# Patient Record
Sex: MALE | Race: WHITE | NOT HISPANIC OR LATINO | Employment: FULL TIME | URBAN - METROPOLITAN AREA
[De-identification: names, ages, dates, MRNs, and addresses within clinical notes are randomized per-mention and may not be internally consistent; named-entity substitution may affect disease eponyms.]

---

## 2017-01-24 ENCOUNTER — ALLIED HEALTH/NURSE VISIT (OUTPATIENT)
Dept: FAMILY MEDICINE | Facility: CLINIC | Age: 62
End: 2017-01-24
Payer: COMMERCIAL

## 2017-01-24 DIAGNOSIS — Z23 NEED FOR PROPHYLACTIC VACCINATION AND INOCULATION AGAINST INFLUENZA: Primary | ICD-10-CM

## 2017-01-24 PROCEDURE — 90471 IMMUNIZATION ADMIN: CPT

## 2017-01-24 PROCEDURE — 99207 ZZC NO CHARGE NURSE ONLY: CPT

## 2017-01-24 PROCEDURE — 90686 IIV4 VACC NO PRSV 0.5 ML IM: CPT

## 2017-01-24 NOTE — PROGRESS NOTES
Injectable Influenza Immunization Documentation    1.  Is the person to be vaccinated sick today?  No    2. Does the person to be vaccinated have an allergy to eggs or to a component of the vaccine?  No    3. Has the person to be vaccinated today ever had a serious reaction to influenza vaccine in the past?  No    4. Has the person to be vaccinated ever had Guillain-[place] syndrome?  No     Form completed by Nedra Mchugh MA

## 2017-01-24 NOTE — NURSING NOTE
Prior to injection verified patient identity using patient's name and date of birth.  Per orders of Dr. Nowak injection of Flu  given by Nedra Mchugh MA. Patient instructed to remain in clinic for 20 minutes afterwards and to report any adverse reaction to me immediately.

## 2017-03-28 DIAGNOSIS — K21.9 GASTROESOPHAGEAL REFLUX DISEASE WITHOUT ESOPHAGITIS: ICD-10-CM

## 2017-03-28 NOTE — TELEPHONE ENCOUNTER
omeprazole (PRILOSEC) 20 MG capsule      Last Written Prescription Date: 3/30/16  Last Fill Quantity: 30,  # refills: 11   Last Office Visit with [facility], [facility] or [facility] prescribing provider: 7/14/16

## 2017-05-23 DIAGNOSIS — G47.00 INSOMNIA: ICD-10-CM

## 2017-05-25 NOTE — TELEPHONE ENCOUNTER
amitriptyline (ELAVIL) 10 MG tablet  Last Written Prescription Date: 09/22/2016  Last Fill Quantity: 60, # refills: 3  Last Office Visit with [facility], [facility] or [facility] prescribing provider: 07/14/2016        BP Readings from Last 3 Encounters:   07/14/16 108/64   06/27/16 116/74   06/19/16 118/82     Last PHQ-9 score on record=   PHQ-9 SCORE 7/20/2016   Total Score 11

## 2017-05-26 RX ORDER — AMITRIPTYLINE HYDROCHLORIDE 10 MG/1
10 TABLET ORAL AT BEDTIME
Qty: 60 TABLET | Refills: 0 | Status: SHIPPED | OUTPATIENT
Start: 2017-05-26 | End: 2017-07-10

## 2017-05-26 NOTE — TELEPHONE ENCOUNTER
Patient returned call. Informed him that he would need an office visit for further refills. He will call back to set up an appointment after he can find a time off from work.    Thanks  Martin Jean Baptiste

## 2017-05-26 NOTE — TELEPHONE ENCOUNTER
Rx refilled per RN protocol.  #60    Will forward to schedulers to schedule patient for OV.  Nedra Brady RN

## 2017-07-10 ENCOUNTER — OFFICE VISIT (OUTPATIENT)
Dept: FAMILY MEDICINE | Facility: CLINIC | Age: 62
End: 2017-07-10
Payer: COMMERCIAL

## 2017-07-10 VITALS
HEIGHT: 71 IN | DIASTOLIC BLOOD PRESSURE: 70 MMHG | HEART RATE: 80 BPM | WEIGHT: 187 LBS | SYSTOLIC BLOOD PRESSURE: 120 MMHG | TEMPERATURE: 96.9 F | OXYGEN SATURATION: 97 % | BODY MASS INDEX: 26.18 KG/M2

## 2017-07-10 DIAGNOSIS — E78.5 HYPERLIPIDEMIA LDL GOAL <160: Primary | ICD-10-CM

## 2017-07-10 DIAGNOSIS — Z12.5 SCREENING FOR PROSTATE CANCER: ICD-10-CM

## 2017-07-10 DIAGNOSIS — E03.9 HYPOTHYROIDISM, UNSPECIFIED TYPE: ICD-10-CM

## 2017-07-10 DIAGNOSIS — K21.9 GASTROESOPHAGEAL REFLUX DISEASE WITHOUT ESOPHAGITIS: ICD-10-CM

## 2017-07-10 DIAGNOSIS — G47.00 INSOMNIA, UNSPECIFIED TYPE: ICD-10-CM

## 2017-07-10 LAB
ANION GAP SERPL CALCULATED.3IONS-SCNC: 9 MMOL/L (ref 3–14)
BUN SERPL-MCNC: 19 MG/DL (ref 7–30)
CALCIUM SERPL-MCNC: 9.2 MG/DL (ref 8.5–10.1)
CHLORIDE SERPL-SCNC: 105 MMOL/L (ref 94–109)
CHOLEST SERPL-MCNC: 278 MG/DL
CO2 SERPL-SCNC: 27 MMOL/L (ref 20–32)
CREAT SERPL-MCNC: 0.94 MG/DL (ref 0.66–1.25)
CREAT UR-MCNC: 115 MG/DL
GFR SERPL CREATININE-BSD FRML MDRD: 82 ML/MIN/1.7M2
GLUCOSE SERPL-MCNC: 102 MG/DL (ref 70–99)
HDLC SERPL-MCNC: 63 MG/DL
HGB BLD-MCNC: 15.6 G/DL (ref 13.3–17.7)
LDLC SERPL CALC-MCNC: 199 MG/DL
MICROALBUMIN UR-MCNC: <5 MG/L
MICROALBUMIN/CREAT UR: NORMAL MG/G CR (ref 0–17)
NONHDLC SERPL-MCNC: 215 MG/DL
POTASSIUM SERPL-SCNC: 4.7 MMOL/L (ref 3.4–5.3)
PSA SERPL-ACNC: 1.02 UG/L (ref 0–4)
SODIUM SERPL-SCNC: 141 MMOL/L (ref 133–144)
TRIGL SERPL-MCNC: 79 MG/DL
TSH SERPL DL<=0.005 MIU/L-ACNC: 0.88 MU/L (ref 0.4–4)

## 2017-07-10 PROCEDURE — G0103 PSA SCREENING: HCPCS | Performed by: FAMILY MEDICINE

## 2017-07-10 PROCEDURE — 99214 OFFICE O/P EST MOD 30 MIN: CPT | Performed by: FAMILY MEDICINE

## 2017-07-10 PROCEDURE — 36415 COLL VENOUS BLD VENIPUNCTURE: CPT | Performed by: FAMILY MEDICINE

## 2017-07-10 PROCEDURE — 84443 ASSAY THYROID STIM HORMONE: CPT | Performed by: FAMILY MEDICINE

## 2017-07-10 PROCEDURE — 80061 LIPID PANEL: CPT | Performed by: FAMILY MEDICINE

## 2017-07-10 PROCEDURE — 82043 UR ALBUMIN QUANTITATIVE: CPT | Performed by: FAMILY MEDICINE

## 2017-07-10 PROCEDURE — 85018 HEMOGLOBIN: CPT | Performed by: FAMILY MEDICINE

## 2017-07-10 PROCEDURE — 80048 BASIC METABOLIC PNL TOTAL CA: CPT | Performed by: FAMILY MEDICINE

## 2017-07-10 RX ORDER — LEVOTHYROXINE SODIUM 125 UG/1
125 TABLET ORAL DAILY
Qty: 90 TABLET | Refills: 3 | Status: SHIPPED | OUTPATIENT
Start: 2017-07-10 | End: 2018-07-02

## 2017-07-10 RX ORDER — AMITRIPTYLINE HYDROCHLORIDE 10 MG/1
10 TABLET ORAL AT BEDTIME
Qty: 60 TABLET | Refills: 0 | Status: SHIPPED | OUTPATIENT
Start: 2017-07-10 | End: 2017-09-09

## 2017-07-10 ASSESSMENT — PAIN SCALES - GENERAL: PAINLEVEL: NO PAIN (0)

## 2017-07-10 NOTE — MR AVS SNAPSHOT
"              After Visit Summary   7/10/2017    Jerson Ewing    MRN: [id number]           Patient Information     Date Of Birth          1955        Visit Information        Provider Department      7/10/2017 8:20 AM Leonid Nowak MD [facility]        Today's Diagnoses     Hyperlipidemia LDL goal <160    -  1    Hypothyroidism, unspecified type        Screening for prostate cancer           Follow-ups after your visit        Who to contact     If you have questions or need follow up information about today's clinic visit or your schedule please contact [facility] directly at [phone].  Normal or non-critical lab and imaging results will be communicated to you by [employer], letter or phone within 4 business days after the clinic has received the results. If you do not hear from us within 7 days, please contact the clinic through [employer] or phone. If you have a critical or abnormal lab result, we will notify you by phone as soon as possible.  Submit refill requests through [employer] or call your pharmacy and they will forward the refill request to us. Please allow 3 business days for your refill to be completed.          Additional Information About Your Visit        MyChart Information     [employer] lets you send messages to your doctor, view your test results, renew your prescriptions, schedule appointments and more. To sign up, go to [email or web address] . Click on \"Log in\" on the left side of the screen, which will take you to the Welcome page. Then click on \"Sign up Now\" on the right side of the page.     You will be asked to enter the access code listed below, as well as some personal information. Please follow the directions to create your username and password.     Your access code is: [email or web address]  Expires: 10/8/2017  9:05 AM     Your access code will  in 90 days. If you need help or a new code, please call your [facility] or [phone].        Care " "EveryWhere ID     This is your Care EveryWhere ID. This could be used by other organizations to access your [place] medical records  [id number]        Your Vitals Were     Pulse Temperature Height Pulse Oximetry BMI (Body Mass Index)       80 96.9  F (36.1  C) (Temporal) 5' 11.1\" (1.806 m) 97% 26.01 kg/m2        Blood Pressure from Last 3 Encounters:   07/10/17 120/70   07/14/16 108/64   06/27/16 116/74    Weight from Last 3 Encounters:   07/10/17 187 lb (84.8 kg)   07/14/16 174 lb (78.9 kg)   06/27/16 179 lb 4.8 oz (81.3 kg)              We Performed the Following     Albumin Random Urine Quantitative     Basic metabolic panel  (Ca, Cl, CO2, Creat, Gluc, K, Na, BUN)     Hemoglobin     Lipid Profile (Chol, Trig, HDL, LDL calc)     PSA, screen     TSH with free T4 reflex        Primary Care Provider Office Phone # Fax #    Leonid Nowak [phone]       [facility] [address] [place] [address]        Equal Access to Services     DEEP DAMIAN : Hadii aad ku hadasho Soomaali, waaxda luqadaha, qaybta kaalmada adeegyada, laura black . So [facility] [phone].    ATENCIÓN: Si habla español, tiene a dalton disposición servicios gratuitos de asistencia lingüística. Llame al [phone].    We comply with applicable federal civil rights laws and [place] laws. We do not discriminate on the basis of race, color, national origin, age, disability sex, sexual orientation or gender identity.            Thank you!     Thank you for choosing [facility]  for your care. Our goal is always to provide you with excellent care. Hearing back from our patients is one way we can continue to improve our services. Please take a few minutes to complete the written survey that you may receive in the mail after your visit with us. Thank you!             Your Updated Medication List - Protect others around you: Learn how to safely use, store and throw away " your medicines at www.disposemymeds.org.          This list is accurate as of: 7/10/17  9:05 AM.  Always use your most recent med list.                   Brand Name Dispense Instructions for use Diagnosis    amitriptyline 10 MG tablet    ELAVIL    60 tablet    Take 1 tablet (10 mg) by mouth At Bedtime As needed.  Appointment needed for additional refills.    Insomnia       levothyroxine 125 MCG tablet    SYNTHROID/LEVOTHROID    90 tablet    TAKE ONE TABLET BY MOUTH ONCE DAILY    Hypothyroidism, unspecified type       omeprazole 20 MG CR capsule    priLOSEC    30 capsule    TAKE ONE CAPSULE BY MOUTH ONCE DAILY (30-60 MINUTES BEFORE A MEAL)    Gastroesophageal reflux disease without esophagitis

## 2017-07-10 NOTE — PROGRESS NOTES
SUBJECTIVE:                                                    Jerson Ewing is a 61 year old male who presents to clinic today for the following health issues:      Medication Followup of  Levothyroxine    Taking Medication as prescribed: yes    Side Effects:  None    Medication Helping Symptoms:  yes           Problem list and histories reviewed & adjusted, as indicated.  Additional history: as documented        Reviewed and updated as needed this visit by clinical staff       Reviewed and updated as needed this visit by Provider        SUBJECTIVE:  Jerson  is a 61 year old male who presents for:  Several concerns today. One has hypothyroidism. Needs refill of his medication and needs his thyroid levels checked. Overall he has been feeling fine. We discussed his use of a PPI. He hasn't been on Prilosec for a long time. He has never had a scoping. Other issue is his lipids which have been very high but he had an unusual reaction to Lipitor he developed orange urine and discontinued it.     Past Medical History:   Diagnosis Date     CKD (chronic kidney disease) stage 3, GFR 30-59 ml/min      Elevated LFTs 1/2015    Likely related to statin medication     GERD (gastroesophageal reflux disease)      JOHNSON (obstructive sleep apnea) 1/11/2014    AHI 72 Oxygen rubin 55% CPAP, not using     Unspecified drug dependence, unspecified     previous Meth abuse     Past Surgical History:   Procedure Laterality Date     COLONOSCOPY  12/2011    Due in 12/2021     HC LAP,INGUINAL HERNIA REPR,INITIAL  12/28/2004    Left inguinal hernia.     HC REPAIR ING HERNIA,5+Y/O,REDUCIBL  1981     HERNIA REPAIR, INGUINAL RT/LT  03/21/08    Left      HERNIORRHAPHY INGUINAL Right 3/27/2015    Procedure: HERNIORRHAPHY INGUINAL;  Surgeon: Ed Jacobson MD;  Location: [facility] OR     HYDROCELECTOMY SCROTAL  3/10/2014    Procedure: HYDROCELECTOMY SCROTAL;  Bilateral Hydrocele repair;  Surgeon: Steve Herr MD;  Location: [facility] OR      "TONSILLECTOMY       [facility]       Social History   Substance Use Topics     Smoking status: Never Smoker     Smokeless tobacco: Never Used     Alcohol use No      Comment: quit   6/17/09     Current Outpatient Prescriptions   Medication Sig Dispense Refill     levothyroxine (SYNTHROID/LEVOTHROID) 125 MCG tablet Take 1 tablet (125 mcg) by mouth daily 90 tablet 3     amitriptyline (ELAVIL) 10 MG tablet Take 1 tablet (10 mg) by mouth At Bedtime As needed.  Appointment needed for additional refills. 60 tablet 0     omeprazole (PRILOSEC) 20 MG CR capsule TAKE ONE CAPSULE BY MOUTH ONCE DAILY (30-60 MINUTES BEFORE A MEAL) 30 capsule 3     [DISCONTINUED] levothyroxine (SYNTHROID/LEVOTHROID) 125 MCG tablet TAKE ONE TABLET BY MOUTH ONCE DAILY 90 tablet 1       REVIEW OF SYSTEMS:   5 point ROS negative except as noted above in HPI, including Gen., Resp, CV, GI &  system review.     OBJECTIVE:  Vitals: /70  Pulse 80  Temp 96.9  F (36.1  C) (Temporal)  Ht 5' 11.1\" (1.806 m)  Wt 187 lb (84.8 kg)  SpO2 97%  BMI 26.01 kg/m2  BMI= Body mass index is 26.01 kg/(m^2).  Appears well in no distress. Eyes PERRLA. Neck supple no thyromegaly. Lungs are clear to auscultation. Heart with a regular rhythm and rate the 80s extremities normal. DTRs are 2 over 5 at the knees. Skin clear. TSH is 0.88. Cholesterol is 278 with an LDL of 199 and an HDL of 63. Basic chem panel is normal hemoglobin is normal PSA was normal    ASSESSMENT:  #1 hypothyroidism #2 hyperlipidemia #3 GERD    PLAN:  We will notify him with the thyroid results in the prostate results. We'll continue him on the same dose of his thyroid medication. Had a discussion on lipid medications and he does not want to do this. I recommended he discontinue the Prilosec and get on Zantac 150 mg twice a day. He is having further troubles taking this needs to have scoping done. He understands this.        Leonid Nowak MD  [facility] " [place]

## 2017-07-10 NOTE — NURSING NOTE
"Chief Complaint   Patient presents with     Recheck Medication       Initial /70  Pulse 80  Temp 96.9  F (36.1  C) (Temporal)  Ht 5' 11.1\" (1.806 m)  Wt 187 lb (84.8 kg)  SpO2 97%  BMI 26.01 kg/m2 Estimated body mass index is 26.01 kg/(m^2) as calculated from the following:    Height as of this encounter: 5' 11.1\" (1.806 m).    Weight as of this encounter: 187 lb (84.8 kg).  Medication Reconciliation: complete    "

## 2017-07-12 RX ORDER — LEVOTHYROXINE SODIUM 125 UG/1
TABLET ORAL
Qty: 90 TABLET | Refills: 1 | OUTPATIENT
Start: 2017-07-12

## 2017-09-09 DIAGNOSIS — G47.00 INSOMNIA, UNSPECIFIED TYPE: ICD-10-CM

## 2017-09-11 RX ORDER — AMITRIPTYLINE HYDROCHLORIDE 10 MG/1
TABLET ORAL
Qty: 60 TABLET | Refills: 0 | Status: SHIPPED | OUTPATIENT
Start: 2017-09-11 | End: 2017-11-07

## 2017-09-11 NOTE — TELEPHONE ENCOUNTER
amitriptyline (ELAVIL) 10 MG tablet  Last Written Prescription Date:  07/10/2017  Last Fill Quantity: 60,   # refills: 0  Last Office Visit with [facility], [facility] or [facility] prescribing provider: 07/10/2017  Future Office visit:       Routing refill request to provider for review/approval because:  Drug not on the [facility], [facility] or [facility] refill protocol or controlled substance

## 2017-11-07 DIAGNOSIS — G47.00 INSOMNIA, UNSPECIFIED TYPE: ICD-10-CM

## 2017-11-13 RX ORDER — AMITRIPTYLINE HYDROCHLORIDE 10 MG/1
TABLET ORAL
Qty: 60 TABLET | Refills: 5 | Status: SHIPPED | OUTPATIENT
Start: 2017-11-13 | End: 2018-10-15

## 2017-11-13 NOTE — TELEPHONE ENCOUNTER
Prescription approved per [facility] Refill Protocol.  Nargis Nowak RN

## 2018-02-14 ENCOUNTER — ALLIED HEALTH/NURSE VISIT (OUTPATIENT)
Dept: URGENT CARE | Facility: RETAIL CLINIC | Age: 63
End: 2018-02-14
Payer: COMMERCIAL

## 2018-02-14 DIAGNOSIS — Z23 NEED FOR PROPHYLACTIC VACCINATION AND INOCULATION AGAINST INFLUENZA: Primary | ICD-10-CM

## 2018-02-14 PROCEDURE — 90471 IMMUNIZATION ADMIN: CPT | Performed by: PHYSICIAN ASSISTANT

## 2018-02-14 PROCEDURE — 90686 IIV4 VACC NO PRSV 0.5 ML IM: CPT | Performed by: PHYSICIAN ASSISTANT

## 2018-02-14 PROCEDURE — 99207 ZZC NO CHARGE NURSE ONLY: CPT | Performed by: PHYSICIAN ASSISTANT

## 2018-02-14 NOTE — MR AVS SNAPSHOT
"              After Visit Summary   2018    Jerson Ewing    MRN: [id number]           Patient Information     Date Of Birth          1955        Visit Information        Provider Department      2018 5:30 PM Shirley Villa PA-C [facility]        Today's Diagnoses     Need for prophylactic vaccination and inoculation against influenza    -  1       Follow-ups after your visit        Who to contact     You can reach your care team any time of the day by calling [phone].  Notification of test results:  If you have an abnormal lab result, we will notify you by phone as soon as possible.         Additional Information About Your Visit        MyChart Information     [employer] lets you send messages to your doctor, view your test results, renew your prescriptions, schedule appointments and more. To sign up, go to [email or web address] . Click on \"Log in\" on the left side of the screen, which will take you to the Welcome page. Then click on \"Sign up Now\" on the right side of the page.     You will be asked to enter the access code listed below, as well as some personal information. Please follow the directions to create your username and password.     Your access code is: [email or web address]  Expires: 5/15/2018  6:16 PM     Your access code will  in 90 days. If you need help or a new code, please call your [place] clinic or [phone].        Care EveryWhere ID     This is your [facility] EveryWhere ID. This could be used by other organizations to access your [place] medical records  [id number]         Blood Pressure from Last 3 Encounters:   07/10/17 120/70   16 108/64   16 116/74    Weight from Last 3 Encounters:   07/10/17 187 lb (84.8 kg)   16 174 lb (78.9 kg)   16 179 lb 4.8 oz (81.3 kg)              We Performed the Following     FLU VAC, SPLIT VIRUS IM > 3 YO (QUADRIVALENT) [42602]     Vaccine Administration, Initial [66129]        Primary Care " Provider Office Phone # Fax #    Leonid Nowak [phone]       [facility] [address] [place] [address]        Equal Access to Services     REE DAMIAN : Hadii wili ku haddavido Soomaali, waaxda luqadaha, qaybta kaalmada adeegyada, laura perez laPascualhugh eric. So [facility] [phone].    ATENCIÓN: Si habla español, tiene a dalton disposición servicios gratuitos de asistencia lingüística. Llame al [phone].    We comply with applicable federal civil rights laws and [place] laws. We do not discriminate on the basis of race, color, national origin, age, disability, sex, sexual orientation, or gender identity.            Thank you!     Thank you for choosing [facility]  for your care. Our goal is always to provide you with excellent care. Hearing back from our patients is one way we can continue to improve our services. Please take a few minutes to complete the written survey that you may receive in the mail after your visit with us. Thank you!             Your Updated Medication List - Protect others around you: Learn how to safely use, store and throw away your medicines at www.disposemymeds.org.          This list is accurate as of 2/14/18  6:16 PM.  Always use your most recent med list.                   Brand Name Dispense Instructions for use Diagnosis    amitriptyline 10 MG tablet    ELAVIL    60 tablet    TAKE ONE TABLET BY MOUTH AT BEDTIME AS NEEDED    Insomnia, unspecified type       levothyroxine 125 MCG tablet    SYNTHROID/LEVOTHROID    90 tablet    Take 1 tablet (125 mcg) by mouth daily    Hypothyroidism, unspecified type       omeprazole 20 MG CR capsule    priLOSEC    30 capsule    TAKE ONE CAPSULE BY MOUTH ONCE DAILY (30-60 MINUTES BEFORE A MEAL)    Gastroesophageal reflux disease without esophagitis

## 2018-02-15 NOTE — PROGRESS NOTES
Injectable Influenza Immunization Documentation    1.  Is the person to be vaccinated sick today?   No    2. Does the person to be vaccinated have an allergy to a component   of the vaccine?   No  Egg Allergy Algorithm Link    3. Has the person to be vaccinated ever had a serious reaction   to influenza vaccine in the past?   No    4. Has the person to be vaccinated ever had Guillain-[place] syndrome?   No  Patient instructed to remain in clinic for 20 minutes afterwards, and to report any adverse reaction to me immediately.  Prior to injection verified patient identity using patient's name and date of birth.      Form completed by Nanci Beckwith

## 2018-07-02 DIAGNOSIS — E03.9 HYPOTHYROIDISM, UNSPECIFIED TYPE: ICD-10-CM

## 2018-07-02 NOTE — TELEPHONE ENCOUNTER
"Requested Prescriptions   Pending Prescriptions Disp Refills     levothyroxine (SYNTHROID/LEVOTHROID) 125 MCG tablet [Pharmacy Med Name: LEVOTHYROXINE SODIUM 125MCG TABS] 90 tablet 3     Sig: TAKE ONE TABLET BY MOUTH ONCE DAILY    Thyroid Protocol Passed    7/2/2018  6:58 AM       Passed - Patient is 12 years or older       Passed - Recent (12 mo) or future (30 days) visit within the authorizing provider's specialty    Patient had office visit in the last 12 months or has a visit in the next 30 days with authorizing provider or within the authorizing provider's specialty.  See \"Patient Info\" tab in inbasket, or \"Choose Columns\" in Meds & Orders section of the refill encounter.           Passed - Normal TSH on file in past 12 months    Recent Labs   Lab Test  07/10/17   0814   TSH  0.88                Last Written Prescription Date:  7/10/17  Last Fill Quantity: 90,  # refills: 3   Last Office Visit with [facility], [facility] or [facility] prescribing provider:  7/10/17   Future Office Visit:       "

## 2018-07-03 RX ORDER — LEVOTHYROXINE SODIUM 125 UG/1
TABLET ORAL
Qty: 90 TABLET | Refills: 0 | Status: SHIPPED | OUTPATIENT
Start: 2018-07-03 | End: 2018-10-03

## 2018-07-03 NOTE — TELEPHONE ENCOUNTER
LEVOTHYROXINE Medication is being filled for 1 time refill only due to:  Patient needs labs TSH. Future labs ordered as listed. Patient needs to be seen because it has been more than one year since last visit............TERESA Ortez

## 2018-10-03 DIAGNOSIS — E03.9 HYPOTHYROIDISM, UNSPECIFIED TYPE: ICD-10-CM

## 2018-10-03 NOTE — TELEPHONE ENCOUNTER
"Requested Prescriptions   Pending Prescriptions Disp Refills     levothyroxine (SYNTHROID/LEVOTHROID) 125 MCG tablet [Pharmacy Med Name: LEVOTHYROXINE SODIUM 125MCG TABS] 90 tablet 0     Sig: TAKE ONE TABLET BY MOUTH ONCE DAILY    Thyroid Protocol Failed    10/3/2018  8:23 AM       Failed - Recent (12 mo) or future (30 days) visit within the authorizing provider's specialty    Patient had office visit in the last 12 months or has a visit in the next 30 days with authorizing provider or within the authorizing provider's specialty.  See \"Patient Info\" tab in inbasket, or \"Choose Columns\" in Meds & Orders section of the refill encounter.           Failed - Normal TSH on file in past 12 months    Recent Labs   Lab Test  07/10/17   0814   TSH  0.88             Passed - Patient is 12 years or older        Last Written Prescription Date:  7/3/18  Last Fill Quantity: 90,  # refills: 0   Last office visit: 7/10/2017 with prescribing provider:     Future Office Visit:      Routing refill request to provider for review/approval because:  Labs not current:  TSH  Patient needs to be seen because it has been more than 1 year since last office visit.  T'd up 1 month for provider review.    Will forward to schedulers to schedule patient for OV.  Nedra Brady RN          "

## 2018-10-04 RX ORDER — LEVOTHYROXINE SODIUM 125 UG/1
125 TABLET ORAL DAILY
Qty: 30 TABLET | Refills: 0 | Status: SHIPPED | OUTPATIENT
Start: 2018-10-04 | End: 2018-10-15

## 2018-10-15 ENCOUNTER — TELEPHONE (OUTPATIENT)
Dept: FAMILY MEDICINE | Facility: CLINIC | Age: 63
End: 2018-10-15

## 2018-10-15 ENCOUNTER — OFFICE VISIT (OUTPATIENT)
Dept: FAMILY MEDICINE | Facility: CLINIC | Age: 63
End: 2018-10-15
Payer: COMMERCIAL

## 2018-10-15 VITALS
DIASTOLIC BLOOD PRESSURE: 62 MMHG | BODY MASS INDEX: 25.1 KG/M2 | OXYGEN SATURATION: 98 % | WEIGHT: 189.4 LBS | SYSTOLIC BLOOD PRESSURE: 118 MMHG | TEMPERATURE: 98.1 F | HEART RATE: 87 BPM | HEIGHT: 73 IN | RESPIRATION RATE: 16 BRPM

## 2018-10-15 DIAGNOSIS — K21.9 GASTROESOPHAGEAL REFLUX DISEASE WITHOUT ESOPHAGITIS: ICD-10-CM

## 2018-10-15 DIAGNOSIS — E78.5 HYPERLIPIDEMIA LDL GOAL <160: ICD-10-CM

## 2018-10-15 DIAGNOSIS — G47.00 INSOMNIA, UNSPECIFIED TYPE: ICD-10-CM

## 2018-10-15 DIAGNOSIS — Z23 NEED FOR PROPHYLACTIC VACCINATION AND INOCULATION AGAINST INFLUENZA: ICD-10-CM

## 2018-10-15 DIAGNOSIS — Z12.5 SCREENING FOR PROSTATE CANCER: ICD-10-CM

## 2018-10-15 DIAGNOSIS — E03.9 HYPOTHYROIDISM, UNSPECIFIED TYPE: Primary | ICD-10-CM

## 2018-10-15 LAB
ANION GAP SERPL CALCULATED.3IONS-SCNC: 6 MMOL/L (ref 3–14)
BUN SERPL-MCNC: 17 MG/DL (ref 7–30)
CALCIUM SERPL-MCNC: 8.7 MG/DL (ref 8.5–10.1)
CHLORIDE SERPL-SCNC: 105 MMOL/L (ref 94–109)
CHOLEST SERPL-MCNC: 322 MG/DL
CO2 SERPL-SCNC: 29 MMOL/L (ref 20–32)
CREAT SERPL-MCNC: 1.16 MG/DL (ref 0.66–1.25)
CREAT UR-MCNC: 254 MG/DL
GFR SERPL CREATININE-BSD FRML MDRD: 64 ML/MIN/1.7M2
GLUCOSE SERPL-MCNC: 98 MG/DL (ref 70–99)
HDLC SERPL-MCNC: 56 MG/DL
HGB BLD-MCNC: 15.7 G/DL (ref 13.3–17.7)
LDLC SERPL CALC-MCNC: 236 MG/DL
MICROALBUMIN UR-MCNC: 19 MG/L
MICROALBUMIN/CREAT UR: 7.4 MG/G CR (ref 0–17)
NONHDLC SERPL-MCNC: 266 MG/DL
POTASSIUM SERPL-SCNC: 4.3 MMOL/L (ref 3.4–5.3)
PSA SERPL-ACNC: 0.99 UG/L (ref 0–4)
SODIUM SERPL-SCNC: 140 MMOL/L (ref 133–144)
TRIGL SERPL-MCNC: 148 MG/DL
TSH SERPL DL<=0.005 MIU/L-ACNC: 1.74 MU/L (ref 0.4–4)

## 2018-10-15 PROCEDURE — 90471 IMMUNIZATION ADMIN: CPT | Performed by: FAMILY MEDICINE

## 2018-10-15 PROCEDURE — 84443 ASSAY THYROID STIM HORMONE: CPT | Performed by: FAMILY MEDICINE

## 2018-10-15 PROCEDURE — G0103 PSA SCREENING: HCPCS | Performed by: FAMILY MEDICINE

## 2018-10-15 PROCEDURE — 85018 HEMOGLOBIN: CPT | Performed by: FAMILY MEDICINE

## 2018-10-15 PROCEDURE — 99214 OFFICE O/P EST MOD 30 MIN: CPT | Mod: 25 | Performed by: FAMILY MEDICINE

## 2018-10-15 PROCEDURE — 82043 UR ALBUMIN QUANTITATIVE: CPT | Performed by: FAMILY MEDICINE

## 2018-10-15 PROCEDURE — 36415 COLL VENOUS BLD VENIPUNCTURE: CPT | Performed by: FAMILY MEDICINE

## 2018-10-15 PROCEDURE — 80061 LIPID PANEL: CPT | Performed by: FAMILY MEDICINE

## 2018-10-15 PROCEDURE — 90682 RIV4 VACC RECOMBINANT DNA IM: CPT | Performed by: FAMILY MEDICINE

## 2018-10-15 PROCEDURE — 80048 BASIC METABOLIC PNL TOTAL CA: CPT | Performed by: FAMILY MEDICINE

## 2018-10-15 RX ORDER — LEVOTHYROXINE SODIUM 125 UG/1
125 TABLET ORAL DAILY
Qty: 90 TABLET | Refills: 3 | Status: SHIPPED | OUTPATIENT
Start: 2018-10-15 | End: 2019-09-13

## 2018-10-15 RX ORDER — AMITRIPTYLINE HYDROCHLORIDE 10 MG/1
10 TABLET ORAL
Qty: 60 TABLET | Refills: 5 | Status: SHIPPED | OUTPATIENT
Start: 2018-10-15 | End: 2019-10-30

## 2018-10-15 NOTE — MR AVS SNAPSHOT
"              After Visit Summary   10/15/2018    Jerson Ewing    MRN: [id number]           Patient Information     Date Of Birth          1955        Visit Information        Provider Department      10/15/2018 7:40 AM Leonid Nowak MD [facility]        Today's Diagnoses     Hypothyroidism, unspecified type    -  1    Gastroesophageal reflux disease without esophagitis        Hyperlipidemia LDL goal <160        Insomnia, unspecified type        Screening for prostate cancer        Need for prophylactic vaccination and inoculation against influenza           Follow-ups after your visit        Who to contact     If you have questions or need follow up information about today's clinic visit or your schedule please contact [facility] directly at [phone].  Normal or non-critical lab and imaging results will be communicated to you by MyChart, letter or phone within 4 business days after the clinic has received the results. If you do not hear from us within 7 days, please contact the clinic through MyChart or phone. If you have a critical or abnormal lab result, we will notify you by phone as soon as possible.  Submit refill requests through [employer] or call your pharmacy and they will forward the refill request to us. Please allow 3 business days for your refill to be completed.          Additional Information About Your Visit        Care EveryWhere ID     This is your Care EveryWhere ID. This could be used by other organizations to access your [facility] medical records  [id number]        Your Vitals Were     Pulse Temperature Respirations Height Pulse Oximetry BMI (Body Mass Index)    87 98.1  F (36.7  C) (Temporal) 16 6' 1.03\" (1.855 m) 98% 24.97 kg/m2       Blood Pressure from Last 3 Encounters:   10/15/18 118/62   07/10/17 120/70   07/14/16 108/64    Weight from Last 3 Encounters:   10/15/18 189 lb 6.4 oz (85.9 kg)   07/10/17 187 lb (84.8 kg)   07/14/16 174 lb (78.9 " kg)              We Performed the Following     **TSH with free T4 reflex FUTURE 6mo     Albumin Random Urine Quantitative with Creat Ratio     BASIC METABOLIC PANEL     FLU VACCINE, (RIV4) RECOMBINANT HA  , IM (FluBlok, egg free) [63927]- >18 YRS ([facility] recommended  50-64 YRS)     Hemoglobin     Lipid panel reflex to direct LDL Fasting     Prostate spec antigen screen     Vaccine Administration, Initial [06226]          Today's Medication Changes          These changes are accurate as of 10/15/18  9:09 AM.  If you have any questions, ask your nurse or doctor.               These medicines have changed or have updated prescriptions.        Dose/Directions    amitriptyline 10 MG tablet   Commonly known as:  ELAVIL   This may have changed:  See the new instructions.   Used for:  Insomnia, unspecified type   Changed by:  Leonid Nowak MD        Dose:  10 mg   Take 1 tablet (10 mg) by mouth nightly as needed   Quantity:  60 tablet   Refills:  5       levothyroxine 125 MCG tablet   Commonly known as:  SYNTHROID/LEVOTHROID   This may have changed:  additional instructions   Used for:  Hypothyroidism, unspecified type   Changed by:  Leonid Nowak MD        Dose:  125 mcg   Take 1 tablet (125 mcg) by mouth daily   Quantity:  90 tablet   Refills:  3            Where to get your medicines      These medications were [facility] [address] [facility] [address]  [address] [facility] [address]     Phone:  [phone]     amitriptyline 10 MG tablet    levothyroxine 125 MCG tablet    omeprazole 20 MG CR capsule                Primary Care Provider Office Phone # Fax #    Leonid Nowak [phone]       [address] [facility] [address]        Equal Access to Services     [facility]: Hadii wili garciao Soiban, waaxda luqadaha, [address] . [facility] [phone].    ATENCIÓN: Si habla español, tiene a dalton disposición  servicios gratuitos de asistencia lingüística. Didier alvarez [phone].    We comply with applicable federal civil rights laws and [place] laws. We do not discriminate on the basis of race, color, national origin, age, disability, sex, sexual orientation, or gender identity.            Thank you!     Thank you for choosing [facility]  for your care. Our goal is always to provide you with excellent care. Hearing back from our patients is one way we can continue to improve our services. Please take a few minutes to complete the written survey that you may receive in the mail after your visit with us. Thank you!             Your Updated Medication List - Protect others around you: Learn how to safely use, store and throw away your medicines at www.disposemymeds.org.          This list is accurate as of 10/15/18  9:09 AM.  Always use your most recent med list.                   Brand Name Dispense Instructions for use Diagnosis    amitriptyline 10 MG tablet    ELAVIL    60 tablet    Take 1 tablet (10 mg) by mouth nightly as needed    Insomnia, unspecified type       levothyroxine 125 MCG tablet    SYNTHROID/LEVOTHROID    90 tablet    Take 1 tablet (125 mcg) by mouth daily    Hypothyroidism, unspecified type       omeprazole 20 MG CR capsule    priLOSEC    30 capsule    TAKE ONE CAPSULE BY MOUTH ONCE DAILY (30-60 MINUTES BEFORE A MEAL)    Gastroesophageal reflux disease without esophagitis

## 2018-10-15 NOTE — TELEPHONE ENCOUNTER
----- Message from Leonid Nowak MD sent at 10/15/2018  9:11 AM CDT -----  Your lipid panel came back today with a cholesterol of 322 this is the highest it has ever been.  Bad cholesterol LDL was 236.  If you cannot tolerate any of the statin medications may be a consult with a specialist at the [facility] and lipids would be in order and we should try to arrange this.

## 2018-10-15 NOTE — TELEPHONE ENCOUNTER
Left message to call back. Please relay results to pt and find out if he want to start medication.

## 2018-10-15 NOTE — PROGRESS NOTES

## 2018-10-15 NOTE — PROGRESS NOTES
Your lipid panel came back today with a cholesterol of 322 this is the highest it has ever been.  Bad cholesterol LDL was 236.  If you cannot tolerate any of the statin medications may be a consult with a specialist at the [facility] and lipids would be in order and we should try to arrange this.

## 2018-10-15 NOTE — PROGRESS NOTES
SUBJECTIVE:   Jerson Ewing is a 62 year old male who presents to clinic today for the following health issues:      Medication Followup of Levothyroxine     Taking Medication as prescribed: yes    Side Effects:  None    Medication Helping Symptoms:  yes           Problem list and histories reviewed & adjusted, as indicated.  Additional history: as documented        Reviewed and updated as needed this visit by clinical staff       Reviewed and updated as needed this visit by Provider        SUBJECTIVE:  Jerson  is a 62 year old male who presents for: Follow-up of several issues.  He has hypothyroidism and needs a TSH checked and refill of medication.  He has GERD.  He pretty much continuously uses Prilosec.  When he is off that he has trouble.  He takes amitriptyline for insomnia seems to be doing okay with this.  He is known hyperlipidemia but is not tolerated statins at all.    Past Medical History:   Diagnosis Date     CKD (chronic kidney disease) stage 3, GFR 30-59 ml/min (H)      Elevated LFTs 1/2015    Likely related to statin medication     GERD (gastroesophageal reflux disease)      JOHNSON (obstructive sleep apnea) 1/11/2014    AHI 72 Oxygen rubin 55% CPAP, not using     Unspecified drug dependence, unspecified     previous Meth abuse     Past Surgical History:   Procedure Laterality Date     COLONOSCOPY  12/2011    Due in 12/2021     HC LAP,INGUINAL HERNIA REPR,INITIAL  12/28/2004    Left inguinal hernia.     HC REPAIR ING HERNIA,5+Y/O,REDUCIBL  1981     HERNIA REPAIR, INGUINAL RT/LT  03/21/08    Left      HERNIORRHAPHY INGUINAL Right 3/27/2015    Procedure: HERNIORRHAPHY INGUINAL;  Surgeon: Ed Jacobson MD;  Location: [facility] OR     HYDROCELECTOMY SCROTAL  3/10/2014    Procedure: HYDROCELECTOMY SCROTAL;  Bilateral Hydrocele repair;  Surgeon: Steve Herr MD;  Location: [facility] OR     TONSILLECTOMY       [facility]       Social History   Substance Use Topics     Smoking status: Never  "Smoker     Smokeless tobacco: Never Used     Alcohol use No      Comment: quit   6/17/09     Current Outpatient Prescriptions   Medication Sig Dispense Refill     amitriptyline (ELAVIL) 10 MG tablet Take 1 tablet (10 mg) by mouth nightly as needed 60 tablet 5     levothyroxine (SYNTHROID/LEVOTHROID) 125 MCG tablet Take 1 tablet (125 mcg) by mouth daily Appointment needed for additional refills. 30 tablet 0     omeprazole (PRILOSEC) 20 MG CR capsule TAKE ONE CAPSULE BY MOUTH ONCE DAILY (30-60 MINUTES BEFORE A MEAL) 30 capsule 3     [DISCONTINUED] amitriptyline (ELAVIL) 10 MG tablet TAKE ONE TABLET BY MOUTH AT BEDTIME AS NEEDED 60 tablet 5       REVIEW OF SYSTEMS:   5 point ROS negative except as noted above in HPI, including Gen., Resp, CV, GI &  system review.     OBJECTIVE:  Vitals: /62  Pulse 87  Temp 98.1  F (36.7  C) (Temporal)  Resp 16  Ht 6' 1.03\" (1.855 m)  Wt 189 lb 6.4 oz (85.9 kg)  SpO2 98%  BMI 24.97 kg/m2  BMI= Body mass index is 24.97 kg/(m^2).  He is alert appears well.  Eyes PERRLA.  Neck supple no thyromegaly.  Lungs are clear.  Heart regular rhythm rate in the 70s.  Extremities with no edema.  DTRs are 2/5 at the knees bilaterally.    ASSESSMENT:  #1 hypothyroidism #2 GERD #3 hyperlipidemia #4 insomnia    PLAN:  Going to notify him with his lab results and if we have to adjust thyroid we will.  We will discuss his lipids with him.  Recommended EGD.  We will put in a referral for this.  Will notify him of his lipids which are extremely high perhaps get a consult with the  of  lipid clinic        Leonid Nowak MD  [facility]            "

## 2018-10-16 ENCOUNTER — TELEPHONE (OUTPATIENT)
Dept: FAMILY MEDICINE | Facility: CLINIC | Age: 63
End: 2018-10-16

## 2018-10-16 NOTE — LETTER
[address] [id number] [phone]        October 19, 2018    Jerson Ewing  [address]  [facility] [address]

## 2018-10-16 NOTE — TELEPHONE ENCOUNTER
Left message for patient to return call to schedule EGD/colonoscopy. If Emma or Rosaura are not available, please transfer to same day surgery

## 2018-10-16 NOTE — TELEPHONE ENCOUNTER
Patient called and message relayed to him. States he's going to think about what he wants to do and call back.

## 2018-10-16 NOTE — LETTER
Upper Endoscopy    Date of procedure:_11/13/2018______      Location:[facility] Same Day Surgery___  Please arrive at:_12:00pm____________    Procedure time:__1:00pm___________    Review the preparation schedule below for the five days preceding your procedure.     If you have any questions, please call [phone].          5 Days Prior  *CHECK WITH YOUR INSURANCE REGARDING COVERAGE PRIOR TO PROCEDURE.  If you take Coumadin (Warfarin), Plavix, Ticlid, Aggrenox:, insulin, diabetic pills and/or iron products contact your doctor for specific instructions.  Have INR checked the day before the procedure.  Please remember to arrange a responsible adult to accompany you home.   must be     present upon discharge.    1 Days Prior  Eat normally up until midnight.  You may drink small amounts of clear liquids up until 4 hours prior to your arrival.  **Please see Clear Liquid Diet Sheet for suggested liquids.    Procedure Day    From midnight until 3 hours prior to your procedure, you may drink small amounts of clear liquids.  Do not take your morning medications until after you have completed your procedure.  Bring a list of your medications with you on the day of your procedure.     *Reminder:  Have transportation arranged to take you home.   must accompany you to the procedure.  Do not plan to drive or operate vehicles or machinery the day of your exam.      Clear Liquid Diet  Beverages  Coffee, Decaffeinated coffee, Tea (without milk or non-dairy creamer)  Carbonated beverages (pop)  Water  Sports Drinks    Desserts  Jello (except red or purple)  Fruit ice  Popsicle    Fruit and Fruit Juices  Citrus juices, strained  Fruit nectars, strained  Apple juice  Fruit drinks    Soups  Broth or Bouillon  Consomme  Meat stock, strained  Vegetable broth, strained    Sweets  Hard candy, plain  Sugar    Miscellaneous  Flavorings  Salt    No red or purple colored juices or Jello  No dairy products  No foods  containing seeds 2 days prior to procedure  No alcoholic beverages               Directions to [facility]    From the North:   Take the [address] exit off of [address] (on the south side of [place]).  Turn left on [address]  Turn left at the first stoplight (at [facility]).  The hospital and clinic is the third building on the left.     From the South:  Follow [address] north to the first [place] exit.  Exit on [address] following it to the right.  Turn left at the first stoplight.  The hospital and clinic is the third building on the left.    From the East:     Following [address] west, turn left at the stoplight onto [address] Follow [address] through [place].  Take a right at the light on [address] (at [facility]).  The hospital and clinic is the third building on the left.    From the West:    Following [address] going east, turn south on [address].  Take the [address]  exit off of [address] (on the south side of [place]).  Follow [address] through [place] to the stoplight on [facility] [address] (at [facility]). Take a left.  The hospital and clinic is the third building on the left.        UPPER ENDOSCOPY INFORMATION  Upper endoscopy (also known as an upper GI endoscopy, esophagogastro-duodenoscopy [EGD], or panendoscopy) is a procedure that enables your physician to examine the lining of the upper part of your gastrointestinal tract, ie. The esophagus (swallowing tube), stomach, and duodenum (first portion of the small intestine) using a thin flexible tube with its own lens and light source.    Upper endoscopy is usually performed to evaluate symptoms of persistent upper abdominal pain, nausea, vomiting or difficulty swallowing.  It is also the best test for finding the cause of bleeding from the upper gastrointestinal tract.    Upper endoscopy is more accurate than x-ray films for detecting inflammation, ulcers or tumors of the esophagus, stomach and  duodenum.  Upper endoscopy can detect early cancer and can distinguish between benign (non-cancerous) and malignant (cancerous) conditions when biopsies (small tissue samples) of suspicious areas are obtained.  Biopsies are taken for many reasons and do not necessarily mean that cancer is suspected.  A cytology test (introduction of a small brush to collect cells) may also be performed.    Upper endoscopy is also used to treat conditions present in the upper gastrointestinal tract.  A variety of instruments can be passed through the endoscope that allow many abnormalities to be treated directly with little or no discomfort.  This may include stretching narrowed areas, removing polyps (usually benign growths) or swallowed objects, or treating upper gastrointestinal bleeding.  Safe and effective endoscopic control of bleeding has reduced the need for transfusions and surgery in many patients.    For the best and safest examination, the stomach must be completely empty.  You should have nothing to eat or drink, including water, for approximately 4 hours prior to your examination.    WHAT CAN BE EXPECTED DURING THE UPPER ENDOSCOPY?  Your doctor will review with you why upper endoscopy is being performed, whether any alternative tests are available, and possible complications from the procedure.  Your throat will be sprayed with a local anesthetic before the procedure begins and you may be given medication through a vein to help you relax during the procedure.  While you are in a comfortable position on your side, the endoscope is passed through the mouth and then is passed in turn through the esophagus, stomach, and duodenum.  The endoscope does not interfere with your breathing during the test.  Most patients consider the procedure to be only slightly uncomfortable and many patients fall asleep during the procedure.    WHAT HAPPENS AFTER THE UPPER ENDOSCOPY?  After the procedure, you will be monitored in the endoscopy  area until most of the effects of the medication have work off.  Your throat may be a little sore for a while, and you may feel bloated right after the procedure because of the air introduced into your stomach during the test.  You will be able to resume your diet after you leave unless you are instructed otherwise.    If you receive sedation, you will need to arrange to have a responsible adult drive and accompany you home from the examination because the sedative may affect your judgment and reflexes for the rest of the day.  You will not be allowed to take a taxi or bus as transportation home.  If you receive sedation, you will not be allowed to drive after the procedure even though you may not feel tired.    WHAT ARE THE POSSIBLE COMPLICATIONS OF UPPER ENDOSCOPY?  Endoscopy is generally safe.  Complications can occur but are rare when the test is performed by physicians with specialized training and experience in this procedure.  Bleeding may occur from a biopsy site or where a polyp was removed.  It is usually minimal and rarely requires blood transfusions or surgery.  Localized irritation of the vein where the medication was injected may rarely cause a tender lump lasting for several weeks, but this will go away.  Applying heat packs or hot moist towels may help relieve discomfort.  Other potential risks include a reaction to the sedatives used and complications from underlying medical conditions.  Major complication, eg, perforation (a tear that might require surgery for repair) are very uncommon.      It is important for you to recognize early signs of any possible complication.  If you begin to run a fever after the test, begin to have trouble swallowing, or have increasing throat, chest or abdominal pain, let your doctor know about it promptly.

## 2018-10-18 NOTE — TELEPHONE ENCOUNTER
Left message for patient to return call to schedule colonoscopy or EGD. If Rosaura or Emma are unavailable, please transfer to the surgery center.

## 2018-10-19 ENCOUNTER — HOSPITAL ENCOUNTER (OUTPATIENT)
Facility: CLINIC | Age: 63
End: 2018-10-19
Attending: SPECIALIST | Admitting: SPECIALIST
Payer: COMMERCIAL

## 2018-10-19 NOTE — TELEPHONE ENCOUNTER
Date of colonoscopy/EGD: 11/13  Surgeon: Dr. Noriega  Prep:EGD  Packet:Colonoscopy/EGD instructions mailed to patient's home address.   Date: 10/19/2018      Surgery Scheduler

## 2018-11-07 NOTE — TELEPHONE ENCOUNTER
Patient called to cancel his scope. States that he has a high deductible and he can't afford it. Notified Teresa in the OR.

## 2019-02-11 DIAGNOSIS — K21.9 GASTROESOPHAGEAL REFLUX DISEASE WITHOUT ESOPHAGITIS: ICD-10-CM

## 2019-02-12 NOTE — TELEPHONE ENCOUNTER
"omeprazole  Last Written Prescription Date:  10/15/2018  Last Fill Quantity: 30,  # refills: 3   Last office visit: 10/15/2018 with prescribing provider:      Future Office Visit:      Requested Prescriptions   Pending Prescriptions Disp Refills     omeprazole (PRILOSEC) 20 MG DR capsule [Pharmacy Med Name: OMEPRAZOLE 20MG CPDR] 30 capsule 3     Sig: TAKE ONE CAPSULE BY MOUTH ONCE DAILY 30-60 MINUTES BEFORE A MEAL    PPI Protocol Passed - 2/11/2019  6:09 PM       Passed - Not on Clopidogrel (unless Pantoprazole ordered)       Passed - No diagnosis of osteoporosis on record       Passed - Recent (12 mo) or future (30 days) visit within the authorizing provider's specialty    Patient had office visit in the last 12 months or has a visit in the next 30 days with authorizing provider or within the authorizing provider's specialty.  See \"Patient Info\" tab in inbasket, or \"Choose Columns\" in Meds & Orders section of the refill encounter.             Passed - Medication is active on med list       Passed - Patient is age 18 or older          Prescription approved per [facility] Refill Protocol.      Nargis Nowak RN on 2/12/2019 at 3:07 PM    "

## 2019-09-13 DIAGNOSIS — K21.9 GASTROESOPHAGEAL REFLUX DISEASE WITHOUT ESOPHAGITIS: ICD-10-CM

## 2019-09-13 DIAGNOSIS — E03.9 HYPOTHYROIDISM, UNSPECIFIED TYPE: ICD-10-CM

## 2019-09-13 RX ORDER — LEVOTHYROXINE SODIUM 125 UG/1
TABLET ORAL
Qty: 30 TABLET | Refills: 0 | Status: SHIPPED | OUTPATIENT
Start: 2019-09-13 | End: 2019-10-14

## 2019-09-13 NOTE — TELEPHONE ENCOUNTER
"Will forward to schedulers to schedule patient for yearly office visit on or prior to 10/15/2019.      Requested Prescriptions   Pending Prescriptions Disp Refills     omeprazole (PRILOSEC) 20 MG DR capsule [Pharmacy Med Name: OMEPRAZOLE 20MG CPDR] 30 capsule 6     Sig: TAKE ONE CAPSULE BY MOUTH ONCE DAILY 30-60 MINUTES BEFORE A MEAL   Last Written Prescription Date:  2/12/2019  Last Fill Quantity: 30,  # refills: 6   Last office visit: 10/15/2018 with prescribing provider:     Future Office Visit:        PPI Protocol Passed - 9/13/2019  5:09 AM        Passed - Not on Clopidogrel (unless Pantoprazole ordered)        Passed - No diagnosis of osteoporosis on record        Passed - Recent (12 mo) or future (30 days) visit within the authorizing provider's specialty     Patient had office visit in the last 12 months or has a visit in the next 30 days with authorizing provider or within the authorizing provider's specialty.  See \"Patient Info\" tab in inbasket, or \"Choose Columns\" in Meds & Orders section of the refill encounter.              Passed - Medication is active on med list        Passed - Patient is age 18 or older   Prescription approved per [facility] Refill Protocol.       levothyroxine (SYNTHROID/LEVOTHROID) 125 MCG tablet [Pharmacy Med Name: LEVOTHYROXINE SODIUM 125MCG TABS] 90 tablet 3     Sig: TAKE ONE TABLET BY MOUTH ONCE DAILY   Last Written Prescription Date:  10/15/2018  Last Fill Quantity: 90,  # refills: 3   Last office visit: 10/15/2018 with prescribing provider:     Future Office Visit:        Thyroid Protocol Passed - 9/13/2019  5:09 AM        Passed - Patient is 12 years or older        Passed - Recent (12 mo) or future (30 days) visit within the authorizing provider's specialty     Patient had office visit in the last 12 months or has a visit in the next 30 days with authorizing provider or within the authorizing provider's specialty.  See \"Patient Info\" tab in inbasket, or \"Choose Columns\" in Meds & " Orders section of the refill encounter.              Passed - Medication is active on med list        Passed - Normal TSH on file in past 12 months     Recent Labs   Lab Test 10/15/18  0743   TSH 1.74              Prescription approved per [facility] Refill Protocol.  Terri Wetzel RN

## 2019-10-14 DIAGNOSIS — E03.9 HYPOTHYROIDISM, UNSPECIFIED TYPE: ICD-10-CM

## 2019-10-14 DIAGNOSIS — K21.9 GASTROESOPHAGEAL REFLUX DISEASE WITHOUT ESOPHAGITIS: ICD-10-CM

## 2019-10-14 NOTE — TELEPHONE ENCOUNTER
Routing refill request to provider for review  Routing to scheduling to schedule patient for  Annual appointment    Levothyroxine  Last Written Prescription Date:  09/13/2019  Last Fill Quantity: 30,  # refills: 0   Last office visit: 10/15/2018 with prescribing provider:  Eliu   Future Office Visit:      Omeprazole  Last Written Prescription Date:  09/13/2019  Last Fill Quantity: 30,  # refills: 0   Last office visit: 10/15/2018 with prescribing provider:  Eliu   Future Office Visit:     Routing refill request to provider for review/approval because:  Kelsy given x1 and patient did not follow up, please advise

## 2019-10-16 RX ORDER — LEVOTHYROXINE SODIUM 125 UG/1
TABLET ORAL
Qty: 30 TABLET | Refills: 0 | Status: SHIPPED | OUTPATIENT
Start: 2019-10-16 | End: 2019-10-30

## 2019-10-30 ENCOUNTER — OFFICE VISIT (OUTPATIENT)
Dept: FAMILY MEDICINE | Facility: CLINIC | Age: 64
End: 2019-10-30
Payer: COMMERCIAL

## 2019-10-30 VITALS
HEIGHT: 71 IN | BODY MASS INDEX: 26.15 KG/M2 | HEART RATE: 88 BPM | OXYGEN SATURATION: 98 % | SYSTOLIC BLOOD PRESSURE: 110 MMHG | WEIGHT: 186.8 LBS | RESPIRATION RATE: 16 BRPM | TEMPERATURE: 96.8 F | DIASTOLIC BLOOD PRESSURE: 76 MMHG

## 2019-10-30 DIAGNOSIS — E78.5 HYPERLIPIDEMIA LDL GOAL <130: Primary | ICD-10-CM

## 2019-10-30 DIAGNOSIS — K21.9 GASTROESOPHAGEAL REFLUX DISEASE WITHOUT ESOPHAGITIS: ICD-10-CM

## 2019-10-30 DIAGNOSIS — G47.00 INSOMNIA, UNSPECIFIED TYPE: ICD-10-CM

## 2019-10-30 DIAGNOSIS — E78.5 HYPERLIPIDEMIA LDL GOAL <130: ICD-10-CM

## 2019-10-30 DIAGNOSIS — E03.9 HYPOTHYROIDISM, UNSPECIFIED TYPE: Primary | ICD-10-CM

## 2019-10-30 DIAGNOSIS — R21 RASH: ICD-10-CM

## 2019-10-30 DIAGNOSIS — Z23 NEED FOR PROPHYLACTIC VACCINATION AND INOCULATION AGAINST INFLUENZA: ICD-10-CM

## 2019-10-30 DIAGNOSIS — Z12.5 SCREENING FOR PROSTATE CANCER: ICD-10-CM

## 2019-10-30 LAB
ALBUMIN SERPL-MCNC: 3.7 G/DL (ref 3.4–5)
ALP SERPL-CCNC: 92 U/L (ref 40–150)
ALT SERPL W P-5'-P-CCNC: 46 U/L (ref 0–70)
ANION GAP SERPL CALCULATED.3IONS-SCNC: 7 MMOL/L (ref 3–14)
AST SERPL W P-5'-P-CCNC: 20 U/L (ref 0–45)
BILIRUB SERPL-MCNC: 0.3 MG/DL (ref 0.2–1.3)
BUN SERPL-MCNC: 20 MG/DL (ref 7–30)
CALCIUM SERPL-MCNC: 8.9 MG/DL (ref 8.5–10.1)
CHLORIDE SERPL-SCNC: 105 MMOL/L (ref 94–109)
CHOLEST SERPL-MCNC: 319 MG/DL
CO2 SERPL-SCNC: 28 MMOL/L (ref 20–32)
CREAT SERPL-MCNC: 1.22 MG/DL (ref 0.66–1.25)
GFR SERPL CREATININE-BSD FRML MDRD: 63 ML/MIN/{1.73_M2}
GLUCOSE SERPL-MCNC: 91 MG/DL (ref 70–99)
HDLC SERPL-MCNC: 66 MG/DL
LDLC SERPL CALC-MCNC: 232 MG/DL
NONHDLC SERPL-MCNC: 253 MG/DL
POTASSIUM SERPL-SCNC: 4.6 MMOL/L (ref 3.4–5.3)
PROT SERPL-MCNC: 7.1 G/DL (ref 6.8–8.8)
PSA SERPL-ACNC: 0.96 UG/L (ref 0–4)
SODIUM SERPL-SCNC: 140 MMOL/L (ref 133–144)
TRIGL SERPL-MCNC: 103 MG/DL
TSH SERPL DL<=0.005 MIU/L-ACNC: 3.09 MU/L (ref 0.4–4)

## 2019-10-30 PROCEDURE — 90471 IMMUNIZATION ADMIN: CPT | Performed by: FAMILY MEDICINE

## 2019-10-30 PROCEDURE — G0103 PSA SCREENING: HCPCS | Performed by: FAMILY MEDICINE

## 2019-10-30 PROCEDURE — 99214 OFFICE O/P EST MOD 30 MIN: CPT | Mod: 25 | Performed by: FAMILY MEDICINE

## 2019-10-30 PROCEDURE — 36415 COLL VENOUS BLD VENIPUNCTURE: CPT | Performed by: FAMILY MEDICINE

## 2019-10-30 PROCEDURE — 80053 COMPREHEN METABOLIC PANEL: CPT | Performed by: FAMILY MEDICINE

## 2019-10-30 PROCEDURE — 84443 ASSAY THYROID STIM HORMONE: CPT | Performed by: FAMILY MEDICINE

## 2019-10-30 PROCEDURE — 80061 LIPID PANEL: CPT | Performed by: FAMILY MEDICINE

## 2019-10-30 PROCEDURE — 90682 RIV4 VACC RECOMBINANT DNA IM: CPT | Performed by: FAMILY MEDICINE

## 2019-10-30 RX ORDER — LEVOTHYROXINE SODIUM 125 UG/1
125 TABLET ORAL DAILY
Qty: 90 TABLET | Refills: 3 | Status: SHIPPED | OUTPATIENT
Start: 2019-10-30 | End: 2020-11-10

## 2019-10-30 RX ORDER — CLOTRIMAZOLE AND BETAMETHASONE DIPROPIONATE 10; .64 MG/G; MG/G
CREAM TOPICAL 2 TIMES DAILY
Qty: 45 G | Refills: 0 | Status: SHIPPED | OUTPATIENT
Start: 2019-10-30 | End: 2020-11-23

## 2019-10-30 RX ORDER — AMITRIPTYLINE HYDROCHLORIDE 10 MG/1
10 TABLET ORAL
Qty: 90 TABLET | Refills: 3 | Status: SHIPPED | OUTPATIENT
Start: 2019-10-30 | End: 2020-11-10

## 2019-10-30 ASSESSMENT — MIFFLIN-ST. JEOR: SCORE: 1668.41

## 2019-10-30 NOTE — TELEPHONE ENCOUNTER
Tried to reach patient, left message for patient to call the clinic back.    Wayne Yeager, [facility]

## 2019-10-30 NOTE — PROGRESS NOTES
Subjective     Jerson Ewing is a 64 year old male who presents to clinic today for the following health issues:    HPI     Patient has a dry, itchy spot above his right buttocks. Patient states that it has been present for a month or two. It itches horribly.    He would like his medications refilled today: levothyroxine, Elavil and omeprazole.     Patient would like a flu shot today.         Hypothyroidism Follow-up      Since last visit, patient describes the following symptoms: Weight stable, no hair loss, no skin changes, no constipation, no loose stools      How many servings of fruits and vegetables do you eat daily?  0-1    On average, how many sweetened beverages do you drink each day (soda, juice, sweet tea, etc)?   6    How many days per week do you miss taking your medication? 0    SUBJECTIVE:  Jerson  is a 64 year old male who presents for: Follow-up of his hypothyroidism and hyperlipidemia.  Needs some blood work done.  Is been on Synthroid.  He is last cholesterol is 312 so he took some niacin.  He was prescribed Lipitor at one time and then had a GI bleed.  He is a frightened of going on statins.  Other issue today is he has a rash top of his gluteal folds.    Past Medical History:   Diagnosis Date     CKD (chronic kidney disease) stage 3, GFR 30-59 ml/min (H)      Elevated LFTs 1/2015    Likely related to statin medication     GERD (gastroesophageal reflux disease)      JOHNSON (obstructive sleep apnea) 1/11/2014    AHI 72 Oxygen rubin 55% CPAP, not using     Unspecified drug dependence, unspecified     previous Meth abuse     Past Surgical History:   Procedure Laterality Date     COLONOSCOPY  12/2011    Due in 12/2021     HC LAP,INGUINAL HERNIA REPR,INITIAL  12/28/2004    Left inguinal hernia.     HC REPAIR ING HERNIA,5+Y/O,REDUCIBL  1981     HERNIA REPAIR, INGUINAL RT/LT  03/21/08    Left      HERNIORRHAPHY INGUINAL Right 3/27/2015    Procedure: HERNIORRHAPHY INGUINAL;  Surgeon: Ed Jacobson  "MD Gene;  Location: [facility] OR     HYDROCELECTOMY SCROTAL  3/10/2014    Procedure: HYDROCELECTOMY SCROTAL;  Bilateral Hydrocele repair;  Surgeon: Steve Herr MD;  Location: [facility] OR     TONSILLECTOMY       [facility]       Social History     Tobacco Use     Smoking status: Never Smoker     Smokeless tobacco: Never Used   Substance Use Topics     Alcohol use: No     Alcohol/week: 0.0 standard drinks     Comment: quit   6/17/09     Current Outpatient Medications   Medication Sig Dispense Refill     amitriptyline (ELAVIL) 10 MG tablet Take 1 tablet (10 mg) by mouth nightly as needed 60 tablet 5     clotrimazole-betamethasone (LOTRISONE) 1-0.05 % external cream Apply topically 2 times daily 45 g 0     levothyroxine (SYNTHROID/LEVOTHROID) 125 MCG tablet TAKE ONE TABLET BY MOUTH ONCE DAILY 30 tablet 0     NIACIN PO        omeprazole (PRILOSEC) 20 MG DR capsule TAKE ONE CAPSULE BY MOUTH ONCE DAILY 30-60 MINUTES BEFORE A MEAL 30 capsule 0       REVIEW OF SYSTEMS:   5 point ROS negative except as noted above in HPI, including Gen., Resp, CV, GI &  system review.     OBJECTIVE:  Vitals: /76 (BP Location: Right arm, Patient Position: Sitting, Cuff Size: Adult Large)   Pulse 88   Temp 96.8  F (36  C) (Temporal)   Resp 16   Ht 1.81 m (5' 11.25\")   Wt 84.7 kg (186 lb 12.8 oz)   SpO2 98%   BMI 25.87 kg/m    BMI= Body mass index is 25.87 kg/m .  He is alert comfortable appearing.  Eyes PERRLA.  Neck supple.  Lungs are clear to auscultation.  Heart regular rhythm no murmur.  Extremities normal.  Skin shows a reddened area with well demarcation the upper end of the gluteal folds.    ASSESSMENT:  #1 hypothyroidism #2 hyperlipidemia #3 rash most likely yeast #4 GERD    PLAN:  We will notify him with the results of the thyroid and reorder his Synthroid accordingly.  He needs refill on his omeprazole for GERD.  We will put him on some Lotrisone for the rash.  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  If his lipids are still very high we will have to decide on some alternative treatment.        Leonid Nowak MD  [facility]

## 2019-10-30 NOTE — RESULT ENCOUNTER NOTE
Let him know thyroid test was normal and his Synthroid was reordered.  PSA is normal.  Chemistry panel is normal.  Cholesterol is still way up at 319 and his bad type is 232.  Pretty much the same as last year.  I am going to recommend we try a drug called Zetia which is 10 mg a day we can send this in for him and recheck a lipid panel and ALT AST in 2 to 3 months.

## 2019-10-30 NOTE — TELEPHONE ENCOUNTER
----- Message from Leonid Nowak MD sent at 10/30/2019  9:12 AM CDT -----  Let him know thyroid test was normal and his Synthroid was reordered.  PSA is normal.  Chemistry panel is normal.  Cholesterol is still way up at 319 and his bad type is 232.  Pretty much the same as last year.  I am going to recommend we try a drug called Zetia which is 10 mg a day we can send this in for him and recheck a lipid panel and ALT AST in 2 to 3 months.

## 2019-10-31 NOTE — TELEPHONE ENCOUNTER
Pt informed. Please send Rx to [facility]. He will make another lab appt in 2-3 months. Please place orders.  Thank you,  Bouchra Mares- Patient Representative

## 2019-10-31 NOTE — TELEPHONE ENCOUNTER
Jacqueline is teed up, please review and approve. Patient would like it sent to [facility] in [place]. Lab orders are placed for future.     Wayne Yeager, CMA

## 2019-11-04 RX ORDER — EZETIMIBE 10 MG/1
10 TABLET ORAL DAILY
Qty: 90 TABLET | Refills: 1 | Status: SHIPPED | OUTPATIENT
Start: 2019-11-04 | End: 2020-05-06

## 2020-11-04 DIAGNOSIS — E78.5 HYPERLIPIDEMIA LDL GOAL <130: ICD-10-CM

## 2020-11-06 RX ORDER — EZETIMIBE 10 MG/1
TABLET ORAL
Qty: 90 TABLET | Refills: 0 | Status: SHIPPED | OUTPATIENT
Start: 2020-11-06 | End: 2021-02-10

## 2020-11-06 NOTE — TELEPHONE ENCOUNTER
"Routing refill request to provider for review/approval because:  Labs not current:  Lipid panel, ALT  T'd up 3 month for provider review.          Requested Prescriptions   Pending Prescriptions Disp Refills     ezetimibe (ZETIA) 10 MG tablet [Pharmacy Med Name: EZETIMIBE 10MG TABS] 90 tablet 1     Sig: TAKE ONE TABLET BY MOUTH ONCE DAILY   Last Written Prescription Date:  5/6/2020  Last Fill Quantity: 90,  # refills: 1   Last office visit: 10/30/2019 with prescribing provider:     Future Office Visit:        Antihyperlipidemic agents Failed - 11/4/2020  5:32 PM        Failed - Lipid panel on file in past 12 mos     Recent Labs   Lab Test 10/30/19  0815 01/28/15  1024 01/28/15  1024   CHOL 319*   < > 130   TRIG 103   < > 91   HDL 66   < > 41   *   < > 71   NHDL 253*   < >  --    VLDL  --   --  18   CHOLHDLRATIO  --   --  3.2    < > = values in this interval not displayed.           Failed - Normal serum ALT on record in past 12 mos     Recent Labs   Lab Test 10/30/19  0815   ALT 46           Failed - Recent (12 mo) or future (30 days) visit within the authorizing provider's specialty     Patient has had an office visit with the authorizing provider or a provider within the authorizing providers department within the previous 12 mos or has a future within next 30 days. See \"Patient Info\" tab in inbasket, or \"Choose Columns\" in Meds & Orders section of the refill encounter.            Passed - Medication is active on med list        Passed - Patient is age 18 years or older         Terri Wetzel RN      "

## 2020-11-10 DIAGNOSIS — E03.9 HYPOTHYROIDISM, UNSPECIFIED TYPE: ICD-10-CM

## 2020-11-10 DIAGNOSIS — G47.00 INSOMNIA, UNSPECIFIED TYPE: ICD-10-CM

## 2020-11-10 DIAGNOSIS — K21.9 GASTROESOPHAGEAL REFLUX DISEASE WITHOUT ESOPHAGITIS: ICD-10-CM

## 2020-11-10 RX ORDER — LEVOTHYROXINE SODIUM 125 UG/1
TABLET ORAL
Qty: 90 TABLET | Refills: 0 | Status: SHIPPED | OUTPATIENT
Start: 2020-11-10 | End: 2021-02-09

## 2020-11-10 RX ORDER — AMITRIPTYLINE HYDROCHLORIDE 10 MG/1
TABLET ORAL
Qty: 90 TABLET | Refills: 0 | Status: SHIPPED | OUTPATIENT
Start: 2020-11-10 | End: 2021-02-10

## 2020-11-10 NOTE — TELEPHONE ENCOUNTER
"Requested Prescriptions   Pending Prescriptions Disp Refills     omeprazole (PRILOSEC) 20 MG DR capsule [Pharmacy Med Name: OMEPRAZOLE 20MG CPDR] 90 capsule 3     Sig: TAKE ONE CAPSULE BY MOUTH ONCE DAILY   Last Written Prescription Date:  10/30/2019  Last Fill Quantity: 90,  # refills: 3   Last office visit: 10/30/2019 with prescribing provider:     Future Office Visit:        PPI Protocol Failed - 11/10/2020  7:15 AM        Failed - Recent (12 mo) or future (30 days) visit within the authorizing provider's specialty     Patient has had an office visit with the authorizing provider or a provider within the authorizing providers department within the previous 12 mos or has a future within next 30 days. See \"Patient Info\" tab in inbasket, or \"Choose Columns\" in Meds & Orders section of the refill encounter.            Passed - Not on Clopidogrel (unless Pantoprazole ordered)        Passed - No diagnosis of osteoporosis on record        Passed - Medication is active on med list        Passed - Patient is age 18 or older      Medication is being filled for 1 time refill only due to:  Patient needs to be seen because it has been more than one year since last visit.       levothyroxine (SYNTHROID/LEVOTHROID) 125 MCG tablet [Pharmacy Med Name: LEVOTHYROXINE SODIUM 125MCG TABS] 90 tablet 3     Sig: TAKE ONE TABLET BY MOUTH ONCE DAILY       Thyroid Protocol Failed - 11/10/2020  7:15 AM        Failed - Recent (12 mo) or future (30 days) visit within the authorizing provider's specialty     Patient has had an office visit with the authorizing provider or a provider within the authorizing providers department within the previous 12 mos or has a future within next 30 days. See \"Patient Info\" tab in inbasket, or \"Choose Columns\" in Meds & Orders section of the refill encounter.              Failed - Normal TSH on file in past 12 months     Recent Labs   Lab Test 10/30/19  0815   TSH 3.09              Passed - Patient is 12 years " "or older        Passed - Medication is active on med list      Medication is being filled for 1 time refill only due to:  Patient needs to be seen because it has been more than one year since last visit.       amitriptyline (ELAVIL) 10 MG tablet [Pharmacy Med Name: AMITRIPTYLINE HCL 10MG TABS] 90 tablet 3     Sig: TAKE ONE TABLET BY MOUTH AT NIGHTLY AS NEEDED       Tricyclic Agents ( Annual appt and no PHQ9) Failed - 11/10/2020  7:15 AM        Failed - Blood Pressure under 140/90 in past 12 mos     BP Readings from Last 3 Encounters:   10/30/19 110/76   10/15/18 118/62   07/10/17 120/70           Failed - Recent (12 mo) or future (30 days) visit within authorizing provider's specialty     Patient has had an office visit with the authorizing provider or a provider within the authorizing providers department within the previous 12 mos or has a future within next 30 days. See \"Patient Info\" tab in inbasket, or \"Choose Columns\" in Meds & Orders section of the refill encounter.              Passed - Medication is active on med list        Passed - Patient is age 18 or older         Medication is being filled for 1 time refill only due to:  Patient needs to be seen because it has been more than one year since last visit.     Sending to scheduling for yearly office visit due    Terri Wetzel RN        "

## 2020-11-23 ENCOUNTER — VIRTUAL VISIT (OUTPATIENT)
Dept: FAMILY MEDICINE | Facility: CLINIC | Age: 65
End: 2020-11-23
Payer: COMMERCIAL

## 2020-11-23 DIAGNOSIS — Z12.5 SCREENING FOR PROSTATE CANCER: ICD-10-CM

## 2020-11-23 DIAGNOSIS — E78.5 HYPERLIPIDEMIA LDL GOAL <160: Primary | ICD-10-CM

## 2020-11-23 DIAGNOSIS — E03.9 ACQUIRED HYPOTHYROIDISM: ICD-10-CM

## 2020-11-23 PROCEDURE — 99214 OFFICE O/P EST MOD 30 MIN: CPT | Mod: 95 | Performed by: FAMILY MEDICINE

## 2020-11-23 RX ORDER — DIPHENHYDRAMINE HCL 50 MG
50 CAPSULE ORAL EVERY 6 HOURS PRN
Status: ON HOLD | COMMUNITY
End: 2022-08-04

## 2020-11-23 NOTE — PROGRESS NOTES
"Jerson Ewing is a 65 year old male who is being evaluated via a billable telephone visit.      The patient has been notified of following:     \"This telephone visit will be conducted via a call between you and your physician/provider. We have found that certain health care needs can be provided without the need for a physical exam.  This service lets us provide the care you need with a short phone conversation.  If a prescription is necessary we can send it directly to your pharmacy.  If lab work is needed we can place an order for that and you can then stop by our lab to have the test done at a later time.    Telephone visits are billed at different rates depending on your insurance coverage. During this emergency period, for some insurers they may be billed the same as an in-person visit.  Please reach out to your insurance provider with any questions.    If during the course of the call the physician/provider feels a telephone visit is not appropriate, you will not be charged for this service.\"    Patient has given verbal consent for Telephone visit?  Yes    What phone number would you like to be contacted at? [phone]    How would you like to obtain your AVS? Mail a copy    Subjective     Jerson Ewing is a 65 year old male who presents via phone visit today for the following health issues: Virtual visit to discuss his medications for hypothyroidism and hyperlipidemia and GERD.  He is due for blood work.  He appointment set up for 3 days ago face-to-face.  Presented to the clinic upfront and announced that he had been exposed to someone with Covid and he had a cough.  We turned him away and suggest that he make a virtual visit for this.  He subsequently got tested the next day and was positive for Covid.  So he is now in isolation.  He is doing okay.  Cough is better.  Temperature is down.  Oxygen saturations are okay.  Far as his medications go he has refills and is doing okay with them.  He could not " tolerate statins in the past.    HPI     Hyperlipidemia Follow-Up      Are you regularly taking any medication or supplement to lower your cholesterol?   Yes- Zetia    Are you having muscle aches or other side effects that you think could be caused by your cholesterol lowering medication?  No    Hypothyroidism Follow-up      Since last visit, patient describes the following symptoms: Weight stable, no hair loss, no skin changes, no constipation, no loose stools      How many servings of fruits and vegetables do you eat daily?  2-3    On average, how many sweetened beverages do you drink each day (Examples: soda, juice, sweet tea, etc.  Do NOT count diet or artificially sweetened beverages)?   1 every 2 days    How many days per week do you exercise enough to make your heart beat faster? 7    How many minutes a day do you exercise enough to make your heart beat faster? 9 or less    How many days per week do you miss taking your medication? Very rarely           Review of Systems   Constitutional, HEENT, cardiovascular, pulmonary, gi and gu systems are negative, except as otherwise noted.       Objective   Vitals - Patient Reported  Temperature (Patient Reported): 97.3  F (36.3  C)  Pain Score: No Pain (0)        healthy, alert and no distress  PSYCH: Alert and oriented times 3; coherent speech, normal   rate and volume, able to articulate logical thoughts, able   to abstract reason, no tangential thoughts, no hallucinations   or delusions  His affect is normal and pleasant  RESP: No cough, no audible wheezing, able to talk in full sentences  Remainder of exam unable to be completed due to telephone visits            Assessment/Plan:    Assessment & Plan     Hyperlipidemia LDL goal <160  He will come in after his isolation.  In quarantine is over.  We will notify him with results.  - Lipid panel reflex to direct LDL Fasting; Future  - **Comprehensive metabolic panel FUTURE anytime; Future    Acquired  hypothyroidism  He will come in for blood work after quarantine and will notify with results.  - **TSH with free T4 reflex FUTURE anytime; Future    Screening for prostate cancer  We will notify with results.  - **Prostate spec antigen screen FUTURE anytime; Future            Return in about 2 weeks (around 12/7/2020) for Lab Work.    Leonid Nowak MD  [facility]    Phone call duration:  7 minutes

## 2021-02-07 DIAGNOSIS — E78.5 HYPERLIPIDEMIA LDL GOAL <130: ICD-10-CM

## 2021-02-07 DIAGNOSIS — E03.9 HYPOTHYROIDISM, UNSPECIFIED TYPE: ICD-10-CM

## 2021-02-07 DIAGNOSIS — K21.9 GASTROESOPHAGEAL REFLUX DISEASE WITHOUT ESOPHAGITIS: ICD-10-CM

## 2021-02-07 DIAGNOSIS — G47.00 INSOMNIA, UNSPECIFIED TYPE: ICD-10-CM

## 2021-02-09 NOTE — TELEPHONE ENCOUNTER
Routing refill request to provider for review/approval because:  Labs out of range:  ALT  Labs not current:  BP    DENISSE CarltonN, RN  [facility]

## 2021-02-10 RX ORDER — AMITRIPTYLINE HYDROCHLORIDE 10 MG/1
TABLET ORAL
Qty: 90 TABLET | Refills: 0 | Status: SHIPPED | OUTPATIENT
Start: 2021-02-10 | End: 2021-05-11

## 2021-02-10 RX ORDER — LEVOTHYROXINE SODIUM 125 UG/1
TABLET ORAL
Qty: 90 TABLET | Refills: 0 | Status: SHIPPED | OUTPATIENT
Start: 2021-02-10 | End: 2021-05-11

## 2021-02-10 RX ORDER — EZETIMIBE 10 MG/1
TABLET ORAL
Qty: 90 TABLET | Refills: 0 | Status: SHIPPED | OUTPATIENT
Start: 2021-02-10 | End: 2021-05-11

## 2021-05-09 DIAGNOSIS — E78.5 HYPERLIPIDEMIA LDL GOAL <130: ICD-10-CM

## 2021-05-09 DIAGNOSIS — E03.9 HYPOTHYROIDISM, UNSPECIFIED TYPE: ICD-10-CM

## 2021-05-09 DIAGNOSIS — K21.9 GASTROESOPHAGEAL REFLUX DISEASE WITHOUT ESOPHAGITIS: ICD-10-CM

## 2021-05-09 DIAGNOSIS — G47.00 INSOMNIA, UNSPECIFIED TYPE: ICD-10-CM

## 2021-05-11 RX ORDER — AMITRIPTYLINE HYDROCHLORIDE 10 MG/1
TABLET ORAL
Qty: 90 TABLET | Refills: 0 | Status: SHIPPED | OUTPATIENT
Start: 2021-05-11 | End: 2021-08-04

## 2021-05-11 RX ORDER — LEVOTHYROXINE SODIUM 125 UG/1
TABLET ORAL
Qty: 90 TABLET | Refills: 0 | Status: SHIPPED | OUTPATIENT
Start: 2021-05-11 | End: 2021-08-04

## 2021-05-11 RX ORDER — EZETIMIBE 10 MG/1
TABLET ORAL
Qty: 90 TABLET | Refills: 0 | Status: SHIPPED | OUTPATIENT
Start: 2021-05-11 | End: 2021-08-04

## 2021-05-11 NOTE — TELEPHONE ENCOUNTER
Routing refill request to provider for review/approval because:  Labs out of range:  ALT  Labs not current:  BP    DENISSE CarltonN, RN  [facility]

## 2021-08-03 DIAGNOSIS — G47.00 INSOMNIA, UNSPECIFIED TYPE: ICD-10-CM

## 2021-08-03 DIAGNOSIS — E78.5 HYPERLIPIDEMIA LDL GOAL <130: ICD-10-CM

## 2021-08-04 RX ORDER — AMITRIPTYLINE HYDROCHLORIDE 10 MG/1
TABLET ORAL
Qty: 90 TABLET | Refills: 0 | Status: SHIPPED | OUTPATIENT
Start: 2021-08-04 | End: 2021-11-03

## 2021-08-04 RX ORDER — EZETIMIBE 10 MG/1
TABLET ORAL
Qty: 90 TABLET | Refills: 0 | Status: SHIPPED | OUTPATIENT
Start: 2021-08-04 | End: 2021-11-03

## 2021-08-04 NOTE — TELEPHONE ENCOUNTER
"Requested Prescriptions   Pending Prescriptions Disp Refills    ezetimibe (ZETIA) 10 MG tablet [Pharmacy Med Name: EZETIMIBE 10MG TABS] 90 tablet 0     Sig: TAKE ONE TABLET BY MOUTH ONCE DAILY        Antihyperlipidemic agents Failed - 8/3/2021  7:06 AM        Failed - Normal serum ALT on record in past 12 mos     Recent Labs   Lab Test 12/10/20  0904   ALT 73*             Passed - Lipid panel on file in past 12 mos       Recent Labs   Lab Test 12/10/20  0904 01/28/15  1024   CHOL 188 130   TRIG 94 91   HDL 55 41   * 71   NHDL 133*  --    VLDL  --  18   CHOLHDLRATIO  --  3.2               Passed - Recent (12 mo) or future (30 days) visit within the authorizing provider's specialty     Patient has had an office visit with the authorizing provider or a provider within the authorizing providers department within the previous 12 mos or has a future within next 30 days. See \"Patient Info\" tab in inmodulRsket, or \"Choose Columns\" in Meds & Orders section of the refill encounter.              Passed - Medication is active on med list        Passed - Patient is age 18 years or older           amitriptyline (ELAVIL) 10 MG tablet [Pharmacy Med Name: AMITRIPTYLINE HCL 10MG TABS] 90 tablet 0     Sig: TAKE ONE TABLET BY MOUTH AT BEDTIME AS NEEDED        Tricyclic Agents ( Annual appt and no PHQ9) Failed - 8/3/2021  7:06 AM        Failed - Blood Pressure under 140/90 in past 12 mos       BP Readings from Last 3 Encounters:   10/30/19 110/76   10/15/18 118/62   07/10/17 120/70                 Passed - Recent (12 mo) or future (30 days) visit within authorizing provider's specialty     Patient has had an office visit with the authorizing provider or a provider within the authorizing providers department within the previous 12 mos or has a future within next 30 days. See \"Patient Info\" tab in inbasket, or \"Choose Columns\" in Meds & Orders section of the refill encounter.              Passed - Medication is active on med list       "  Passed - Patient is age 18 or older            Last Written Prescription Date:  ezetimibe last filled 5/11/2021 for 90 tabs 0 refills, Amitriptyline last filled 5/11/2021 for 90 tabs 0 refills    Last office visit: 10/30/2019 with prescribing provider:  laverne 11/23/2020 telephone visit    Future Office Visit:          Routing refill request to provider for review/approval because:  Patient needs to be seen because it has been more than 1 year since last office visit.      Lesley Clark RN  [facility]

## 2021-11-02 DIAGNOSIS — E03.9 HYPOTHYROIDISM, UNSPECIFIED TYPE: ICD-10-CM

## 2021-11-02 DIAGNOSIS — K21.9 GASTROESOPHAGEAL REFLUX DISEASE WITHOUT ESOPHAGITIS: ICD-10-CM

## 2021-11-02 DIAGNOSIS — E78.5 HYPERLIPIDEMIA LDL GOAL <130: ICD-10-CM

## 2021-11-02 DIAGNOSIS — G47.00 INSOMNIA, UNSPECIFIED TYPE: ICD-10-CM

## 2021-11-03 RX ORDER — LEVOTHYROXINE SODIUM 125 UG/1
TABLET ORAL
Qty: 90 TABLET | Refills: 0 | Status: SHIPPED | OUTPATIENT
Start: 2021-11-03 | End: 2022-02-03

## 2021-11-03 RX ORDER — AMITRIPTYLINE HYDROCHLORIDE 10 MG/1
TABLET ORAL
Qty: 90 TABLET | Refills: 0 | Status: SHIPPED | OUTPATIENT
Start: 2021-11-03 | End: 2022-02-03

## 2021-11-03 RX ORDER — EZETIMIBE 10 MG/1
TABLET ORAL
Qty: 90 TABLET | Refills: 0 | Status: SHIPPED | OUTPATIENT
Start: 2021-11-03 | End: 2022-02-03

## 2021-11-03 NOTE — TELEPHONE ENCOUNTER
Zetia, Amitriptyline, Prolosec Routing refill request to provider for review/approval because:  Labs out of range:  ALT = 73 from 12/10/20  Labs not current:    TERESA Ortez

## 2021-11-03 NOTE — TELEPHONE ENCOUNTER
Levothyroxine Prescription approved per [facility] Refill Protocol.  TERESA Ortez

## 2022-02-03 DIAGNOSIS — G47.00 INSOMNIA, UNSPECIFIED TYPE: ICD-10-CM

## 2022-02-03 DIAGNOSIS — E03.9 HYPOTHYROIDISM, UNSPECIFIED TYPE: ICD-10-CM

## 2022-02-03 DIAGNOSIS — E78.5 HYPERLIPIDEMIA LDL GOAL <130: ICD-10-CM

## 2022-02-03 DIAGNOSIS — K21.9 GASTROESOPHAGEAL REFLUX DISEASE WITHOUT ESOPHAGITIS: ICD-10-CM

## 2022-02-03 RX ORDER — LEVOTHYROXINE SODIUM 125 UG/1
TABLET ORAL
Qty: 90 TABLET | Refills: 0 | Status: SHIPPED | OUTPATIENT
Start: 2022-02-03 | End: 2022-05-04

## 2022-02-03 RX ORDER — EZETIMIBE 10 MG/1
TABLET ORAL
Qty: 90 TABLET | Refills: 0 | Status: SHIPPED | OUTPATIENT
Start: 2022-02-03 | End: 2022-05-04

## 2022-02-03 RX ORDER — AMITRIPTYLINE HYDROCHLORIDE 10 MG/1
TABLET ORAL
Qty: 90 TABLET | Refills: 0 | Status: SHIPPED | OUTPATIENT
Start: 2022-02-03 | End: 2022-05-04

## 2022-02-03 NOTE — TELEPHONE ENCOUNTER
Synthroid  Routing refill request to provider for review/approval because:  Labs not current:  TSH  Patient needs to be seen because it has been more than 1 year since last office visit.    Omeprazole  Routing refill request to provider for review/approval because:  Patient needs to be seen because it has been more than 1 year since last office visit.    Zetia  Routing refill request to provider for review/approval because:  Labs not current:  LDL, ALT    Elavil  Routing refill request to provider for review/approval because:  Patient needs to be seen because it has been more than 1 year since last office visit.  BP elevated      Marcus Thomas,TERESA

## 2022-05-01 DIAGNOSIS — E03.9 HYPOTHYROIDISM, UNSPECIFIED TYPE: ICD-10-CM

## 2022-05-01 DIAGNOSIS — E78.5 HYPERLIPIDEMIA LDL GOAL <130: ICD-10-CM

## 2022-05-01 DIAGNOSIS — G47.00 INSOMNIA, UNSPECIFIED TYPE: ICD-10-CM

## 2022-05-01 DIAGNOSIS — K21.9 GASTROESOPHAGEAL REFLUX DISEASE WITHOUT ESOPHAGITIS: ICD-10-CM

## 2022-05-01 NOTE — LETTER
[address] [address]  Phone: [phone]  Fax: [phone]        May 4, 2022      Jerson SUMA Kaylin                                                                                                                                [address] [facility] [address]            Dear Mr. Ewing,    We are concerned about your health care.  We recently provided you with a medication refill.  Many medications require routine follow-up with your Doctor.      At this time we ask that: You schedule an appointment for your annual physical. You have not been seen since 11/2020  Your prescription: Has been refilled for 1 time so you may have time for the above noted follow-up.      Thank you,      Leonid Nowak MD  Care Team

## 2022-05-04 RX ORDER — AMITRIPTYLINE HYDROCHLORIDE 10 MG/1
TABLET ORAL
Qty: 30 TABLET | Refills: 0 | Status: SHIPPED | OUTPATIENT
Start: 2022-05-04 | End: 2022-06-06

## 2022-05-04 RX ORDER — EZETIMIBE 10 MG/1
TABLET ORAL
Qty: 30 TABLET | Refills: 0 | Status: SHIPPED | OUTPATIENT
Start: 2022-05-04 | End: 2022-06-06

## 2022-05-04 RX ORDER — LEVOTHYROXINE SODIUM 125 UG/1
TABLET ORAL
Qty: 30 TABLET | Refills: 0 | Status: SHIPPED | OUTPATIENT
Start: 2022-05-04 | End: 2022-06-08

## 2022-05-04 NOTE — TELEPHONE ENCOUNTER
prilosec  Levothyroxine  Elavil  zetia  Routing refill request to provider for review/approval because:  Patient needs to be seen because it has been more than 1 year since last office visit.    Sending to scheduling for yearly office visit overdue, last office visit 11/23/2020.    Terri Wetzel RN

## 2022-05-04 NOTE — TELEPHONE ENCOUNTER
Called and LM for patient to call back. Please help schedule appointment. Letter sent as well.   Ale Hooker MA

## 2022-05-23 ENCOUNTER — OFFICE VISIT (OUTPATIENT)
Dept: FAMILY MEDICINE | Facility: CLINIC | Age: 67
End: 2022-05-23
Payer: COMMERCIAL

## 2022-05-23 VITALS
HEIGHT: 72 IN | HEART RATE: 94 BPM | RESPIRATION RATE: 10 BRPM | WEIGHT: 185 LBS | BODY MASS INDEX: 25.06 KG/M2 | DIASTOLIC BLOOD PRESSURE: 80 MMHG | SYSTOLIC BLOOD PRESSURE: 126 MMHG | TEMPERATURE: 97.5 F | OXYGEN SATURATION: 98 %

## 2022-05-23 DIAGNOSIS — Z12.5 SCREENING FOR PROSTATE CANCER: ICD-10-CM

## 2022-05-23 DIAGNOSIS — E03.9 HYPOTHYROIDISM, UNSPECIFIED TYPE: ICD-10-CM

## 2022-05-23 DIAGNOSIS — Z23 NEED FOR VACCINATION: ICD-10-CM

## 2022-05-23 DIAGNOSIS — Z12.11 SCREEN FOR COLON CANCER: ICD-10-CM

## 2022-05-23 DIAGNOSIS — E78.5 HYPERLIPIDEMIA LDL GOAL <160: ICD-10-CM

## 2022-05-23 DIAGNOSIS — Z00.00 ENCOUNTER FOR MEDICARE ANNUAL WELLNESS EXAM: Primary | ICD-10-CM

## 2022-05-23 DIAGNOSIS — N18.30 STAGE 3 CHRONIC KIDNEY DISEASE, UNSPECIFIED WHETHER STAGE 3A OR 3B CKD (H): ICD-10-CM

## 2022-05-23 LAB
ALBUMIN SERPL-MCNC: 3.6 G/DL (ref 3.4–5)
ALP SERPL-CCNC: 116 U/L (ref 40–150)
ALT SERPL W P-5'-P-CCNC: 62 U/L (ref 0–70)
ANION GAP SERPL CALCULATED.3IONS-SCNC: 2 MMOL/L (ref 3–14)
AST SERPL W P-5'-P-CCNC: 29 U/L (ref 0–45)
BILIRUB SERPL-MCNC: 0.4 MG/DL (ref 0.2–1.3)
BUN SERPL-MCNC: 15 MG/DL (ref 7–30)
CALCIUM SERPL-MCNC: 8.8 MG/DL (ref 8.5–10.1)
CHLORIDE BLD-SCNC: 111 MMOL/L (ref 94–109)
CHOLEST SERPL-MCNC: 213 MG/DL
CO2 SERPL-SCNC: 28 MMOL/L (ref 20–32)
CREAT SERPL-MCNC: 1.06 MG/DL (ref 0.66–1.25)
FASTING STATUS PATIENT QL REPORTED: YES
GFR SERPL CREATININE-BSD FRML MDRD: 78 ML/MIN/1.73M2
GLUCOSE BLD-MCNC: 91 MG/DL (ref 70–99)
HDLC SERPL-MCNC: 66 MG/DL
LDLC SERPL CALC-MCNC: 129 MG/DL
NONHDLC SERPL-MCNC: 147 MG/DL
POTASSIUM BLD-SCNC: 4.7 MMOL/L (ref 3.4–5.3)
PROT SERPL-MCNC: 7.1 G/DL (ref 6.8–8.8)
PSA SERPL-MCNC: 1.1 UG/L (ref 0–4)
SODIUM SERPL-SCNC: 141 MMOL/L (ref 133–144)
TRIGL SERPL-MCNC: 88 MG/DL
TSH SERPL DL<=0.005 MIU/L-ACNC: 0.53 MU/L (ref 0.4–4)

## 2022-05-23 PROCEDURE — 80061 LIPID PANEL: CPT | Performed by: FAMILY MEDICINE

## 2022-05-23 PROCEDURE — 91306 COVID-19,PF,MODERNA (18+ YRS BOOSTER .25ML): CPT | Performed by: FAMILY MEDICINE

## 2022-05-23 PROCEDURE — 80053 COMPREHEN METABOLIC PANEL: CPT | Performed by: FAMILY MEDICINE

## 2022-05-23 PROCEDURE — 99397 PER PM REEVAL EST PAT 65+ YR: CPT | Mod: 25 | Performed by: FAMILY MEDICINE

## 2022-05-23 PROCEDURE — 36415 COLL VENOUS BLD VENIPUNCTURE: CPT | Performed by: FAMILY MEDICINE

## 2022-05-23 PROCEDURE — G0103 PSA SCREENING: HCPCS | Performed by: FAMILY MEDICINE

## 2022-05-23 PROCEDURE — 0064A COVID-19,PF,MODERNA (18+ YRS BOOSTER .25ML): CPT | Performed by: FAMILY MEDICINE

## 2022-05-23 PROCEDURE — 84443 ASSAY THYROID STIM HORMONE: CPT | Performed by: FAMILY MEDICINE

## 2022-05-23 RX ORDER — ACETAMINOPHEN AND CODEINE PHOSPHATE 300; 30 MG/1; MG/1
TABLET ORAL
Status: ON HOLD | COMMUNITY
Start: 2022-01-24 | End: 2022-08-04

## 2022-05-23 RX ORDER — CLINDAMYCIN HCL 300 MG
CAPSULE ORAL
Status: ON HOLD | COMMUNITY
Start: 2022-04-18 | End: 2022-08-04

## 2022-05-23 ASSESSMENT — ENCOUNTER SYMPTOMS
DIZZINESS: 0
FREQUENCY: 0
JOINT SWELLING: 0
DYSURIA: 0
ABDOMINAL PAIN: 0
DIARRHEA: 0
HEARTBURN: 0
NERVOUS/ANXIOUS: 0
ARTHRALGIAS: 0
CHILLS: 0
COUGH: 0
EYE PAIN: 0
MYALGIAS: 0
HEMATOCHEZIA: 0
PALPITATIONS: 0
SORE THROAT: 0
SHORTNESS OF BREATH: 0
HEADACHES: 0
WEAKNESS: 0
NAUSEA: 0
CONSTIPATION: 0
PARESTHESIAS: 0
HEMATURIA: 0
FEVER: 0

## 2022-05-23 ASSESSMENT — ACTIVITIES OF DAILY LIVING (ADL): CURRENT_FUNCTION: NO ASSISTANCE NEEDED

## 2022-05-23 NOTE — LETTER
June 1, 2022      Jerson SUMA Ewing  [address] [facility] [address]        Dear ,    We are writing to inform you of your test results.    Thyroid test was in the normal range PSA was normal cholesterol is up a little bit at 213 from 188 last year.  Chemistry panel was normal.     Leonid Nowak MD    Resulted Orders   Lipid panel reflex to direct LDL Fasting   Result Value Ref Range    Cholesterol 213 (H) <200 mg/dL    Triglycerides 88 <150 mg/dL    Direct Measure HDL 66 >=40 mg/dL    LDL Cholesterol Calculated 129 (H) <=100 mg/dL    Non HDL Cholesterol 147 (H) <130 mg/dL    Patient Fasting > 8hrs? Yes     Narrative    Cholesterol  Desirable:  <200 mg/dL    Triglycerides  Normal:  Less than 150 mg/dL  Borderline High:  150-199 mg/dL  High:  200-499 mg/dL  Very High:  Greater than or equal to 500 mg/dL    Direct Measure HDL  Female:  Greater than or equal to 50 mg/dL   Male:  Greater than or equal to 40 mg/dL    LDL Cholesterol  Desirable:  <100mg/dL  Above Desirable:  100-129 mg/dL   Borderline High:  130-159 mg/dL   High:  160-189 mg/dL   Very High:  >= 190 mg/dL    Non HDL Cholesterol  Desirable:  130 mg/dL  Above Desirable:  130-159 mg/dL  Borderline High:  160-189 mg/dL  High:  190-219 mg/dL  Very High:  Greater than or equal to 220 mg/dL   Comprehensive metabolic panel (BMP + Alb, Alk Phos, ALT, AST, Total. Bili, TP)   Result Value Ref Range    Sodium 141 133 - 144 mmol/L    Potassium 4.7 3.4 - 5.3 mmol/L    Chloride 111 (H) 94 - 109 mmol/L    Carbon Dioxide (CO2) 28 20 - 32 mmol/L    Anion Gap 2 (L) 3 - 14 mmol/L    Urea Nitrogen 15 7 - 30 mg/dL    Creatinine 1.06 0.66 - 1.25 mg/dL    Calcium 8.8 8.5 - 10.1 mg/dL    Glucose 91 70 - 99 mg/dL    Alkaline Phosphatase 116 40 - 150 U/L    AST 29 0 - 45 U/L    ALT 62 0 - 70 U/L    Protein Total 7.1 6.8 - 8.8 g/dL    Albumin 3.6 3.4 - 5.0 g/dL    Bilirubin Total 0.4 0.2 - 1.3 mg/dL    GFR Estimate 78 >60 mL/min/1.73m2      Comment:      Effective  December 21, 2021 eGFRcr in adults is calculated using the 2021 CKD-EPI creatinine equation which includes age and gender (Sade et al., NEJM, DOI: 10.1056/CFOXcv5410662)   PSA, screen   Result Value Ref Range    Prostate Specific Antigen Screen 1.10 0.00 - 4.00 ug/L    Narrative    Assay Method:  Chemiluminescence using Siemens   Vista analyzer.   TSH with free T4 reflex   Result Value Ref Range    TSH 0.53 0.40 - 4.00 mU/L       If you have any questions or concerns, please call the clinic at the number listed above.

## 2022-05-23 NOTE — PATIENT INSTRUCTIONS
Patient Education   Personalized Prevention Plan  You are due for the preventive services outlined below.  Your care team is available to assist you in scheduling these services.  If you have already completed any of these items, please share that information with your care team to update in your medical record.  Health Maintenance Due   Topic Date Due     HIV Screening  Never done     Hepatitis C Screening  Never done     Hepatitis B Vaccine (1 of 3 - Risk 3-dose series) Never done     Zoster (Shingles) Vaccine (1 of 2) Never done     Kidney Microalbumin Urine Test  10/15/2019     Hemoglobin  10/15/2019     Pneumococcal Vaccine (1 - PCV) Never done     AORTIC ANEURYSM SCREENING (SYSTEM ASSIGNED)  Never done     Basic Metabolic Panel  12/10/2021     Cholesterol Lab  12/10/2021     Colorectal Cancer Screening  12/13/2021     COVID-19 Vaccine (4 - Booster for Moderna series) 03/12/2022

## 2022-05-23 NOTE — PROGRESS NOTES
"SUBJECTIVE:   Jerson Ewing is a 66 year old male who presents for Preventive Visit.      Patient has been advised of split billing requirements and indicates understanding: Yes  Are you in the first 12 months of your Medicare coverage?  Yes,  Visual Acuity:      Healthy Habits:     In general, how would you rate your overall health?  Excellent    Frequency of exercise:  2-3 days/week    Duration of exercise:  15-30 minutes    Do you usually eat at least 4 servings of fruit and vegetables a day, include whole grains    & fiber and avoid regularly eating high fat or \"junk\" foods?  Yes    Taking medications regularly:  Yes    Barriers to taking medications:  None    Medication side effects:  None    Ability to successfully perform activities of daily living:  No assistance needed    Home Safety:  No safety concerns identified    Hearing Impairment:  No hearing concerns    In the past 6 months, have you been bothered by leaking of urine?  No    In general, how would you rate your overall mental or emotional health?  Excellent      PHQ-2 Total Score: 0    Additional concerns today:  No    Do you feel safe in your environment? Yes    Have you ever done Advance Care Planning? (For example, a Health Directive, POLST, or a discussion with a medical provider or your loved ones about your wishes): No, advance care planning information given to patient to review.  Patient declined advance care planning discussion at this time.       Fall risk  Fallen 2 or more times in the past year?: No  Any fall with injury in the past year?: No    Cognitive Screening   1) Repeat 3 items (Leader, Season, Table)    2) Clock draw: NORMAL  3) 3 item recall: Recalls 3 objects  Results: 3 items recalled: COGNITIVE IMPAIRMENT LESS LIKELY    Mini-CogTM Copyright MARLEN Lim. Licensed by the author for use in [facility]; reprinted with permission (nannette@.[facility]). All rights reserved.      Do you have sleep apnea, excessive snoring or " daytime drowsiness?: yes    Reviewed and updated as needed this visit by clinical staff    Allergies                 Reviewed and updated as needed this visit by Provider                   Social History     Tobacco Use     Smoking status: Never Smoker     Smokeless tobacco: Never Used   Substance Use Topics     Alcohol use: No     Alcohol/week: 0.0 standard drinks     Comment: quit   6/17/09     If you drink alcohol do you typically have >3 drinks per day or >7 drinks per week? No    Alcohol Use 5/23/2022   Prescreen: >3 drinks/day or >7 drinks/week? Not Applicable   Prescreen: >3 drinks/day or >7 drinks/week? -               Current providers sharing in care for this patient include:   Patient Care Team:  Leonid Nowak MD as PCP - General (Family Practice)  Leonid Nowak MD as Assigned PCP    The following health maintenance items are reviewed in Epic and correct as of today:  Health Maintenance Due   Topic Date Due     ANNUAL REVIEW OF  ORDERS  Never done     HIV SCREENING  Never done     HEPATITIS C SCREENING  Never done     HEPATITIS B IMMUNIZATION (1 of 3 - Risk 3-dose series) Never done     ZOSTER IMMUNIZATION (1 of 2) Never done     ADVANCE CARE PLANNING  04/08/2018     MICROALBUMIN  10/15/2019     HEMOGLOBIN  10/15/2019     MEDICARE ANNUAL WELLNESS VISIT  10/21/2021     Pneumococcal Vaccine: 65+ Years (1 - PCV) Never done     AORTIC ANEURYSM SCREENING (SYSTEM ASSIGNED)  Never done     BMP  12/10/2021     LIPID  12/10/2021     COLORECTAL CANCER SCREENING  12/13/2021     COVID-19 Vaccine (4 - Booster for Moderna series) 03/12/2022     Lab work is in process          Review of Systems   Constitutional: Negative for chills and fever.   HENT: Negative for congestion, ear pain, hearing loss and sore throat.    Eyes: Negative for pain and visual disturbance.   Respiratory: Negative for cough and shortness of breath.    Cardiovascular: Negative for chest pain, palpitations and peripheral edema.  "  Gastrointestinal: Negative for abdominal pain, constipation, diarrhea, heartburn, hematochezia and nausea.   Genitourinary: Negative for dysuria, frequency, genital sores, hematuria, impotence, penile discharge and urgency.   Musculoskeletal: Negative for arthralgias, joint swelling and myalgias.   Skin: Negative for rash.   Neurological: Negative for dizziness, weakness, headaches and paresthesias.   Psychiatric/Behavioral: Negative for mood changes. The patient is not nervous/anxious.          OBJECTIVE:   There were no vitals taken for this visit. Estimated body mass index is 25.87 kg/m  as calculated from the following:    Height as of 10/30/19: 1.81 m (5' 11.25\").    Weight as of 10/30/19: 84.7 kg (186 lb 12.8 oz).  Physical Exam  GENERAL: healthy, alert and no distress  EYES: Eyes grossly normal to inspection, PERRL and conjunctivae and sclerae normal  HENT: ear canals and TM's normal, nose and mouth without ulcers or lesions  NECK: no adenopathy, no asymmetry, masses, or scars and thyroid normal to palpation  RESP: lungs clear to auscultation - no rales, rhonchi or wheezes  CV: regular rate and rhythm, normal S1 S2, no S3 or S4, no murmur, click or rub, no peripheral edema and peripheral pulses strong  ABDOMEN: soft, nontender, no hepatosplenomegaly, no masses and bowel sounds normal  MS: no gross musculoskeletal defects noted, no edema  SKIN: no suspicious lesions or rashes  NEURO: Normal strength and tone, mentation intact and speech normal  PSYCH: mentation appears normal, affect normal/bright    Diagnostic Test Results:  Labs reviewed in Epic  Results for orders placed or performed in visit on 05/23/22 (from the past 24 hour(s))   Lipid panel reflex to direct LDL Fasting   Result Value Ref Range    Cholesterol 213 (H) <200 mg/dL    Triglycerides 88 <150 mg/dL    Direct Measure HDL 66 >=40 mg/dL    LDL Cholesterol Calculated 129 (H) <=100 mg/dL    Non HDL Cholesterol 147 (H) <130 mg/dL    Patient Fasting " > 8hrs? Yes     Narrative    Cholesterol  Desirable:  <200 mg/dL    Triglycerides  Normal:  Less than 150 mg/dL  Borderline High:  150-199 mg/dL  High:  200-499 mg/dL  Very High:  Greater than or equal to 500 mg/dL    Direct Measure HDL  Female:  Greater than or equal to 50 mg/dL   Male:  Greater than or equal to 40 mg/dL    LDL Cholesterol  Desirable:  <100mg/dL  Above Desirable:  100-129 mg/dL   Borderline High:  130-159 mg/dL   High:  160-189 mg/dL   Very High:  >= 190 mg/dL    Non HDL Cholesterol  Desirable:  130 mg/dL  Above Desirable:  130-159 mg/dL  Borderline High:  160-189 mg/dL  High:  190-219 mg/dL  Very High:  Greater than or equal to 220 mg/dL   Comprehensive metabolic panel (BMP + Alb, Alk Phos, ALT, AST, Total. Bili, TP)   Result Value Ref Range    Sodium 141 133 - 144 mmol/L    Potassium 4.7 3.4 - 5.3 mmol/L    Chloride 111 (H) 94 - 109 mmol/L    Carbon Dioxide (CO2) 28 20 - 32 mmol/L    Anion Gap 2 (L) 3 - 14 mmol/L    Urea Nitrogen 15 7 - 30 mg/dL    Creatinine 1.06 0.66 - 1.25 mg/dL    Calcium 8.8 8.5 - 10.1 mg/dL    Glucose 91 70 - 99 mg/dL    Alkaline Phosphatase 116 40 - 150 U/L    AST 29 0 - 45 U/L    ALT 62 0 - 70 U/L    Protein Total 7.1 6.8 - 8.8 g/dL    Albumin 3.6 3.4 - 5.0 g/dL    Bilirubin Total 0.4 0.2 - 1.3 mg/dL    GFR Estimate 78 >60 mL/min/1.73m2   PSA, screen   Result Value Ref Range    Prostate Specific Antigen Screen 1.10 0.00 - 4.00 ug/L    Narrative    Assay Method:  Chemiluminescence using Siemens   Vista analyzer.   TSH with free T4 reflex   Result Value Ref Range    TSH 0.53 0.40 - 4.00 mU/L       ASSESSMENT / PLAN:   (Z00.00) Encounter for Medicare annual wellness exam  (primary encounter diagnosis)  Comment: Generally feels well just needs some blood work done and some meds refilled.  Plan:     (E78.5) Hyperlipidemia LDL goal <160  Comment: On Zetia this is refilled his cholesterol is gone up a little bit we will notify him.  Plan: Lipid panel reflex to direct LDL  "Fasting            (E03.9) Hypothyroidism, unspecified type  Comment: TSH was fine we will notify him  Plan: TSH with free T4 reflex            (Z12.11) Screen for colon cancer  Comment:   Plan: Adult Gastro Ref - Procedure Only            (N18.30) Stage 3 chronic kidney disease, unspecified whether stage 3a or 3b CKD (H)  Comment: Kidney function is normal.  Plan: Comprehensive metabolic panel (BMP + Alb, Alk         Phos, ALT, AST, Total. Bili, TP)            (Z12.5) Screening for prostate cancer  Comment:   Plan: PSA, screen            (Z23) Need for vaccination  Comment:   Plan: COVID-19,PF,MODERNA (18+ YRS BOOSTER .25ML)              Patient has been advised of split billing requirements and indicates understanding: Yes    COUNSELING:  Reviewed preventive health counseling, as reflected in patient instructions       Regular exercise       Healthy diet/nutrition       Vision screening    Estimated body mass index is 25.87 kg/m  as calculated from the following:    Height as of 10/30/19: 1.81 m (5' 11.25\").    Weight as of 10/30/19: 84.7 kg (186 lb 12.8 oz).        He reports that he has never smoked. He has never used smokeless tobacco.      Appropriate preventive services were discussed with this patient, including applicable screening as appropriate for cardiovascular disease, diabetes, osteopenia/osteoporosis, and glaucoma.  As appropriate for age/gender, discussed screening for colorectal cancer, prostate cancer, breast cancer, and cervical cancer. Checklist reviewing preventive services available has been given to the patient.    Reviewed patients plan of care and provided an AVS. The Basic Care Plan (routine screening as documented in Health Maintenance) for Jerson meets the Care Plan requirement. This Care Plan has been established and reviewed with the Patient.    Counseling Resources:  ATP IV Guidelines  Pooled Cohorts Equation Calculator  Breast Cancer Risk Calculator  Breast Cancer: Medication to " Reduce Risk  FRAX Risk Assessment  ICSI Preventive Guidelines  Dietary Guidelines for Americans, 2010  USDA's MyPlate  ASA Prophylaxis  Lung CA Screening    Leonid Nowak MD  [facility]    Identified Health Risks:

## 2022-06-01 ENCOUNTER — TELEPHONE (OUTPATIENT)
Dept: GASTROENTEROLOGY | Facility: CLINIC | Age: 67
End: 2022-06-01
Payer: COMMERCIAL

## 2022-06-01 DIAGNOSIS — K21.9 GASTROESOPHAGEAL REFLUX DISEASE WITHOUT ESOPHAGITIS: ICD-10-CM

## 2022-06-01 DIAGNOSIS — G47.00 INSOMNIA, UNSPECIFIED TYPE: ICD-10-CM

## 2022-06-01 DIAGNOSIS — E78.5 HYPERLIPIDEMIA LDL GOAL <130: ICD-10-CM

## 2022-06-01 NOTE — TELEPHONE ENCOUNTER
.Screening Questions  BlueKIND OF PREP RedLOCATION [review exclusion criteria] GreenSEDATION TYPE  1. Have you had a positive covid test in the last 90 days? N     2. Do you have a legal guardian or medical Power of ?  Are you able to give consent for your medical care?Y (Sedation review/consideration needed)    3. Are you active on mychart? N    4. What insurance is in the chart? BCBS     3.   Ordering/Referring Provider: Leonid Nowak MD     4. BMI 25.1 [BMI OVER 40-EXTENDED PREP]  If greater than 40 review exclusion criteria [PAC APPT IF @ [facility]]      5.  Respiratory Screening :  [If yes to any of the following HOSPITAL setting only]     Do you use daily home oxygen? N    Do you have mod to severe Obstructive Sleep Apnea? N MILD PER PT  [OKAY @ [facility] SH PH RI]   Do you have Pulmonary Hypertension? N     Do you have UNCONTROLLED asthma? N        6.   Have you had a heart or lung transplant? N      7.   Are you currently on dialysis? N [ If yes, G-PREP & HOSPITAL setting only]     8.   Do you have chronic kidney disease? N [ If yes, G-PREP ]    9.   Have you had a stroke or Transient ischemic attack (TIA - aka  mini stroke ) within 6 months?  N (If yes, please review exclusion criteria)    10.   In the past 6 months, have you had any heart related issues including cardiomyopathy or heart attack? N           If yes, did it require cardiac stenting or other implantable device? N      11.   Do you have any implantable devices in your body (pacemaker, defib, LVAD)? N (If yes, please review exclusion criteria)    12.   Do you take nitroglycerin? N           If yes, how often? N  (if yes, HOSPITAL setting ONLY)    13.   Are you currently taking any blood thinners? N           [IF YES, INFORM PATIENT TO FOLLOW UP W/ ORDERING PROVIDER FOR BRIDGING INSTRUCTIONS]     14.   Do you have a diagnosis of diabetes? N   [ If yes, G-PREP ]    15.   [FEMALES] Are you currently pregnant?     If yes, how many  weeks?     16.   Are you taking any prescription pain medications on a routine schedule?  N  [ If yes, EXTENDED PREP.] [If yes, MAC]    17.   Do you have any chemical dependencies such as alcohol, street drugs, or methadone?  N [If yes, MAC]    18.   Do you have any history of post-traumatic stress syndrome, severe anxiety or history of psychosis?  N  [If yes, MAC]    19.   Do you transfer independently?  Y    20.  On a regular basis do you go 3-5 days between bowel movements? N   [ If yes, EXTENDED PREP.]    21.   Preferred LOCAL Pharmacy for Pre Prescription   [facility] - [place], [place] - [address]      Scheduling Details      Caller :  Jerson   (Please ask for phone number if not scheduled by patient)    Type of Procedure Scheduled: Lower Endoscopy [Colonoscopy]  Which Colonoscopy Prep was Sent?: СВЕТЛАНА PATEL CF PATIENTS & GROEN'S PATIENTS NEEDS EXTENDED PREP  Surgeon: CANDIE  Date of Procedure: 8/4  Location:     Sedation Type: MAC    Conscious Sedation- Needs  for 6 hours after the procedure  MAC/General-Needs  for 24 hours after procedure    Pre-op Required at [facility] [place] [place] and OR for MAC sedation: N  (advise patient they will need a pre-op prior to procedure -)      Informed patient they will need an adult  Y  Cannot take any type of public or medical transportation alone    Pre-Procedure Covid test to be completed at [facility] or Externally: 8/1    Confirmed Nurse will call to complete assessment Y    Additional comments:

## 2022-06-06 DIAGNOSIS — E03.9 HYPOTHYROIDISM, UNSPECIFIED TYPE: ICD-10-CM

## 2022-06-06 RX ORDER — EZETIMIBE 10 MG/1
10 TABLET ORAL DAILY
Qty: 90 TABLET | Refills: 1 | Status: SHIPPED | OUTPATIENT
Start: 2022-06-06 | End: 2022-12-02

## 2022-06-06 RX ORDER — AMITRIPTYLINE HYDROCHLORIDE 10 MG/1
TABLET ORAL
Qty: 90 TABLET | Refills: 2 | Status: SHIPPED | OUTPATIENT
Start: 2022-06-06 | End: 2023-02-22

## 2022-06-06 NOTE — TELEPHONE ENCOUNTER
Elavil  Prilosec  Zetia  Prescriptions approved per [facility] Refill Protocol.    Terri Wetzel RN

## 2022-06-08 RX ORDER — LEVOTHYROXINE SODIUM 125 UG/1
TABLET ORAL
Qty: 30 TABLET | Refills: 0 | Status: SHIPPED | OUTPATIENT
Start: 2022-06-08 | End: 2022-08-17

## 2022-07-31 DIAGNOSIS — Z11.59 ENCOUNTER FOR SCREENING FOR OTHER VIRAL DISEASES: Primary | ICD-10-CM

## 2022-08-01 ENCOUNTER — LAB (OUTPATIENT)
Dept: LAB | Facility: CLINIC | Age: 67
End: 2022-08-01
Payer: COMMERCIAL

## 2022-08-01 DIAGNOSIS — Z11.59 ENCOUNTER FOR SCREENING FOR OTHER VIRAL DISEASES: ICD-10-CM

## 2022-08-01 LAB — SARS-COV-2 RNA RESP QL NAA+PROBE: NEGATIVE

## 2022-08-01 PROCEDURE — U0003 INFECTIOUS AGENT DETECTION BY NUCLEIC ACID (DNA OR RNA); SEVERE ACUTE RESPIRATORY SYNDROME CORONAVIRUS 2 (SARS-COV-2) (CORONAVIRUS DISEASE [COVID-19]), AMPLIFIED PROBE TECHNIQUE, MAKING USE OF HIGH THROUGHPUT TECHNOLOGIES AS DESCRIBED BY CMS-2020-01-R: HCPCS

## 2022-08-01 PROCEDURE — U0005 INFEC AGEN DETEC AMPLI PROBE: HCPCS

## 2022-08-03 ENCOUNTER — ANESTHESIA EVENT (OUTPATIENT)
Dept: GASTROENTEROLOGY | Facility: CLINIC | Age: 67
End: 2022-08-03
Payer: COMMERCIAL

## 2022-08-04 ENCOUNTER — HOSPITAL ENCOUNTER (OUTPATIENT)
Facility: CLINIC | Age: 67
Discharge: HOME | End: 2022-08-04
Attending: SURGERY | Admitting: SURGERY
Payer: COMMERCIAL

## 2022-08-04 ENCOUNTER — ANESTHESIA (OUTPATIENT)
Dept: GASTROENTEROLOGY | Facility: CLINIC | Age: 67
End: 2022-08-04
Payer: COMMERCIAL

## 2022-08-04 VITALS
WEIGHT: 185 LBS | HEART RATE: 85 BPM | HEIGHT: 72 IN | DIASTOLIC BLOOD PRESSURE: 74 MMHG | SYSTOLIC BLOOD PRESSURE: 103 MMHG | TEMPERATURE: 97.7 F | BODY MASS INDEX: 25.06 KG/M2 | RESPIRATION RATE: 16 BRPM | OXYGEN SATURATION: 94 %

## 2022-08-04 LAB — COLONOSCOPY: NORMAL

## 2022-08-04 PROCEDURE — 45378 DIAGNOSTIC COLONOSCOPY: CPT | Performed by: SURGERY

## 2022-08-04 PROCEDURE — G0121 COLON CA SCRN NOT HI RSK IND: HCPCS | Performed by: SURGERY

## 2022-08-04 PROCEDURE — 370N000017 HC ANESTHESIA TECHNICAL FEE, PER MIN: Performed by: SURGERY

## 2022-08-04 PROCEDURE — 250N000011 HC RX IP 250 OP 636: Performed by: NURSE ANESTHETIST, CERTIFIED REGISTERED

## 2022-08-04 PROCEDURE — 250N000009 HC RX 250: Performed by: SURGERY

## 2022-08-04 PROCEDURE — 250N000009 HC RX 250: Performed by: NURSE ANESTHETIST, CERTIFIED REGISTERED

## 2022-08-04 PROCEDURE — 258N000003 HC RX IP 258 OP 636: Performed by: SURGERY

## 2022-08-04 RX ORDER — NALOXONE HYDROCHLORIDE 0.4 MG/ML
0.4 INJECTION, SOLUTION INTRAMUSCULAR; INTRAVENOUS; SUBCUTANEOUS
Status: DISCONTINUED | OUTPATIENT
Start: 2022-08-04 | End: 2022-08-04 | Stop reason: HOSPADM

## 2022-08-04 RX ORDER — NALOXONE HYDROCHLORIDE 0.4 MG/ML
0.2 INJECTION, SOLUTION INTRAMUSCULAR; INTRAVENOUS; SUBCUTANEOUS
Status: DISCONTINUED | OUTPATIENT
Start: 2022-08-04 | End: 2022-08-04 | Stop reason: HOSPADM

## 2022-08-04 RX ORDER — ONDANSETRON 2 MG/ML
4 INJECTION INTRAMUSCULAR; INTRAVENOUS
Status: DISCONTINUED | OUTPATIENT
Start: 2022-08-04 | End: 2022-08-04 | Stop reason: HOSPADM

## 2022-08-04 RX ORDER — FLUMAZENIL 0.1 MG/ML
0.2 INJECTION, SOLUTION INTRAVENOUS
Status: DISCONTINUED | OUTPATIENT
Start: 2022-08-04 | End: 2022-08-04 | Stop reason: HOSPADM

## 2022-08-04 RX ORDER — SODIUM CHLORIDE, SODIUM LACTATE, POTASSIUM CHLORIDE, CALCIUM CHLORIDE 600; 310; 30; 20 MG/100ML; MG/100ML; MG/100ML; MG/100ML
INJECTION, SOLUTION INTRAVENOUS CONTINUOUS
Status: DISCONTINUED | OUTPATIENT
Start: 2022-08-04 | End: 2022-08-04 | Stop reason: HOSPADM

## 2022-08-04 RX ORDER — LIDOCAINE HYDROCHLORIDE 20 MG/ML
INJECTION, SOLUTION INFILTRATION; PERINEURAL PRN
Status: DISCONTINUED | OUTPATIENT
Start: 2022-08-04 | End: 2022-08-04

## 2022-08-04 RX ORDER — PROPOFOL 10 MG/ML
INJECTION, EMULSION INTRAVENOUS CONTINUOUS PRN
Status: DISCONTINUED | OUTPATIENT
Start: 2022-08-04 | End: 2022-08-04

## 2022-08-04 RX ORDER — ONDANSETRON 2 MG/ML
4 INJECTION INTRAMUSCULAR; INTRAVENOUS EVERY 6 HOURS PRN
Status: DISCONTINUED | OUTPATIENT
Start: 2022-08-04 | End: 2022-08-04 | Stop reason: HOSPADM

## 2022-08-04 RX ORDER — LIDOCAINE 40 MG/G
CREAM TOPICAL
Status: DISCONTINUED | OUTPATIENT
Start: 2022-08-04 | End: 2022-08-04 | Stop reason: HOSPADM

## 2022-08-04 RX ORDER — PROPOFOL 10 MG/ML
INJECTION, EMULSION INTRAVENOUS PRN
Status: DISCONTINUED | OUTPATIENT
Start: 2022-08-04 | End: 2022-08-04

## 2022-08-04 RX ORDER — ONDANSETRON 4 MG/1
4 TABLET, ORALLY DISINTEGRATING ORAL EVERY 6 HOURS PRN
Status: DISCONTINUED | OUTPATIENT
Start: 2022-08-04 | End: 2022-08-04 | Stop reason: HOSPADM

## 2022-08-04 RX ORDER — PROCHLORPERAZINE MALEATE 5 MG
5 TABLET ORAL EVERY 6 HOURS PRN
Status: DISCONTINUED | OUTPATIENT
Start: 2022-08-04 | End: 2022-08-04 | Stop reason: HOSPADM

## 2022-08-04 RX ADMIN — SODIUM CHLORIDE, POTASSIUM CHLORIDE, SODIUM LACTATE AND CALCIUM CHLORIDE: 600; 310; 30; 20 INJECTION, SOLUTION INTRAVENOUS at 11:11

## 2022-08-04 RX ADMIN — PROPOFOL 150 MCG/KG/MIN: 10 INJECTION, EMULSION INTRAVENOUS at 11:38

## 2022-08-04 RX ADMIN — PROPOFOL 100 MG: 10 INJECTION, EMULSION INTRAVENOUS at 11:38

## 2022-08-04 RX ADMIN — LIDOCAINE HYDROCHLORIDE 60 MG: 20 INJECTION, SOLUTION INFILTRATION; PERINEURAL at 11:38

## 2022-08-04 RX ADMIN — LIDOCAINE HYDROCHLORIDE 0.1 ML: 10 INJECTION, SOLUTION EPIDURAL; INFILTRATION; INTRACAUDAL; PERINEURAL at 11:10

## 2022-08-04 NOTE — DISCHARGE INSTRUCTIONS
[facility]    Home Care Following Endoscopy    Dr. Jett      Activity:  You have just undergone an endoscopic procedure usually performed with conscious sedation.  Do not work or operate machinery (including a car) for at least 12 hours.    I encourage you to walk and attempt to pass this air as soon as possible.    Diet:  Return to the diet you were on before your procedure but eat lightly for the first 12-24 hours.  Drink plenty of water.  Resume any regular medications unless otherwise advised by your physician.  Please begin any new medication prescribed as a result of your procedure as directed by your physician.   If you had any biopsy or polyp removed please refrain from aspirin or aspirin products for 2 days.  If on Coumadin please restart as instructed by your physician.   Pain:  You may take Tylenol as needed for pain.  Expected Recovery:  You can expect some mild abdominal fullness and/or discomfort due to the air used to inflate your intestinal tract.     Call Your Physician if You Have:  After Colonoscopy:  Worsening persisting abdominal pain which is worse with activity.  Fevers (>101 degrees F), chills or shakes.  Passage of continued blood with bowel movements.   Any questions or concerns about your recovery, please call [phone] or after hours [phone] Nurse Advice Line.    Follow-up Care:  You did not have polyps/biopsy tissue sample(s) removed.  See handout from Dr. Jett.

## 2022-08-04 NOTE — ANESTHESIA POSTPROCEDURE EVALUATION
Patient: Jerson Ewing    Procedure: Procedure(s):  COLONOSCOPY       Anesthesia Type:  MAC    Note:  Disposition: Outpatient   Postop Pain Control: Uneventful            Sign Out: Well controlled pain   PONV: No   Neuro/Psych: Uneventful            Sign Out: Acceptable/Baseline neuro status   Airway/Respiratory: Uneventful            Sign Out: Acceptable/Baseline resp. status   CV/Hemodynamics: Uneventful            Sign Out: Acceptable CV status   Other NRE: NONE   DID A NON-ROUTINE EVENT OCCUR? No    Event details/Postop Comments:  Pt was happy with anesthesia care.  No complications.  I will follow up with the pt if needed.           Last vitals:  Vitals Value Taken Time   /74 08/04/22 1200   Temp     Pulse 85 08/04/22 1200   Resp     SpO2 94 % 08/04/22 1212   Vitals shown include unvalidated device data.    Electronically Signed By: JULIUS Lucas CRNA  August 4, 2022  12:13 PM

## 2022-08-04 NOTE — ANESTHESIA CARE TRANSFER NOTE
Patient: Jerson Ewing    Procedure: Procedure(s):  COLONOSCOPY       Diagnosis: Screen for colon cancer [Z12.11]  Diagnosis Additional Information: No value filed.    Anesthesia Type:   MAC     Note:    Oropharynx: spontaneously breathing  Level of Consciousness: awake  Oxygen Supplementation: room air    Independent Airway: airway patency satisfactory and stable  Dentition: dentition unchanged  Vital Signs Stable: post-procedure vital signs reviewed and stable  Report to RN Given: handoff report given  Patient transferred to: Phase II    Handoff Report: Identifed the Patient, Identified the Reponsible Provider, Reviewed the pertinent medical history, Discussed the surgical course, Reviewed Intra-OP anesthesia mangement and issues during anesthesia, Set expectations for post-procedure period and Allowed opportunity for questions and acknowledgement of understanding      Vitals:  Vitals Value Taken Time   /74 08/04/22 1200   Temp     Pulse 85 08/04/22 1200   Resp     SpO2 94 % 08/04/22 1212   Vitals shown include unvalidated device data.    Electronically Signed By: JULIUS Lucas CRNA  August 4, 2022  12:12 PM

## 2022-08-04 NOTE — H&P
Patient seen for Endoscopy    HPI:  Patient is a 66 year old male here for endoscopy. Not taking blood thinning medications. No MI or CVA history. No issues with previous sedation. No recent acute illness.    Review Of Systems    Skin: negative  Ears/Nose/Throat: negative  Respiratory: No shortness of breath, dyspnea on exertion, cough, or hemoptysis  Cardiovascular: negative  Gastrointestinal: negative  Genitourinary: negative  Musculoskeletal: negative  Neurologic: negative  Hematologic/Lymphatic/Immunologic: negative  Endocrine: negative      Past Medical History:   Diagnosis Date     CKD (chronic kidney disease) stage 3, GFR 30-59 ml/min (H)      Elevated LFTs 1/2015    Likely related to statin medication     GERD (gastroesophageal reflux disease)      JOHNSON (obstructive sleep apnea) 1/11/2014    AHI 72 Oxygen rubin 55% CPAP, not using     Unspecified drug dependence, unspecified     previous Meth abuse       Past Surgical History:   Procedure Laterality Date     COLONOSCOPY  12/2011    Due in 12/2021     HC LAP,INGUINAL HERNIA REPR,INITIAL  12/28/2004    Left inguinal hernia.     HC REPAIR ING HERNIA,5+Y/O,REDUCIBL  1981     HERNIA REPAIR, INGUINAL RT/LT  03/21/08    Left      HERNIORRHAPHY INGUINAL Right 3/27/2015    Procedure: HERNIORRHAPHY INGUINAL;  Surgeon: Ed Jacobson MD;  Location: [facility] OR     HYDROCELECTOMY SCROTAL  3/10/2014    Procedure: HYDROCELECTOMY SCROTAL;  Bilateral Hydrocele repair;  Surgeon: Steve Herr MD;  Location: [facility] OR     TONSILLECTOMY       [facility]         Family History   Problem Relation Age of Onset     Cancer Mother 54        uterine     Heart Disease Father         stroke?     Other - See Comments Brother         JOHNSON     Diabetes No family hx of      Hypertension No family hx of        Social History     Socioeconomic History     Marital status:      Spouse name: Not on file     Number of children: Not on file     Years of education: Not on  "file     Highest education level: Not on file   Occupational History     Not on file   Tobacco Use     Smoking status: Never Smoker     Smokeless tobacco: Never Used   Substance and Sexual Activity     Alcohol use: No     Alcohol/week: 0.0 standard drinks     Comment: quit   6/17/09     Drug use: No     Comment: meth   quit   6/17/09     Sexual activity: Yes     Partners: Female   Other Topics Concern     Parent/sibling w/ CABG, MI or angioplasty before 65F 55M? Not Asked      Service Not Asked     Blood Transfusions Not Asked     Caffeine Concern No     Occupational Exposure Not Asked     Hobby Hazards Not Asked     Sleep Concern Yes     Stress Concern Not Asked     Weight Concern No     Special Diet Not Asked     Back Care Not Asked     Exercise Not Asked     Bike Helmet Not Asked     Seat Belt Not Asked     Self-Exams Not Asked   Social History Narrative     Not on file     Social Determinants of Health     Financial Resource Strain: Not on file   Food Insecurity: Not on file   Transportation Needs: Not on file   Physical Activity: Not on file   Stress: Not on file   Social Connections: Not on file   Intimate Partner Violence: Not on file   Housing Stability: Not on file       No current outpatient medications on file.       Medications and history reviewed    Physical exam:  Vitals: BP (!) 135/95   Temp 97.7  F (36.5  C) (Oral)   Resp 16   Ht 1.825 m (5' 11.85\")   Wt 83.9 kg (185 lb)   SpO2 96%   BMI 25.20 kg/m    BMI= Body mass index is 25.2 kg/m .    Constitutional: Healthy, alert, non-distressed   Head: Normo-cephalic, atraumatic, no lesions, masses or tenderness   Cardiovascular: RRR, no new murmurs, +S1, +S2 heart sounds, no clicks, rubs or gallops   Respiratory: CTAB, no rales, rhonchi or wheezing, equal chest rise, good respiratory effort   Gastrointestinal: Soft, non-tender, non distended, no rebound rigidity or guarding, no masses or hernias palpated   : Deferred  Musculoskeletal: Moves " all extremities, normal  strength, no deformities noted   Skin: No suspicious lesions or rashes   Psychiatric: Mentation appears normal, affect appropriate   Hematologic/Lymphatic/Immunologic: Normal cervical and supraclavicular lymph nodes   Patient able to get up on table without difficulty.    Labs show:  No results found for this or any previous visit (from the past 24 hour(s)).    Assessment: Endoscopy  Plan: Pt cleared for anesthesia for proposed procedure.    Antony Jtet, DO

## 2022-08-04 NOTE — ANESTHESIA PREPROCEDURE EVALUATION
Anesthesia Pre-Procedure Evaluation    Patient: Jerson Ewing   MRN: [id number] : 1955        Procedure : Procedure(s):  COLONOSCOPY          Past Medical History:   Diagnosis Date     CKD (chronic kidney disease) stage 3, GFR 30-59 ml/min (H)      Elevated LFTs 2015    Likely related to statin medication     GERD (gastroesophageal reflux disease)      JOHNSON (obstructive sleep apnea) 2014    AHI 72 Oxygen rubin 55% CPAP, not using     Unspecified drug dependence, unspecified     previous Meth abuse      Past Surgical History:   Procedure Laterality Date     COLONOSCOPY  2011    Due in 2021     HC LAP,INGUINAL HERNIA REPR,INITIAL  2004    Left inguinal hernia.     HC REPAIR ING HERNIA,5+Y/O,REDUCIBL  1981     HERNIA REPAIR, INGUINAL RT/LT  08    Left      HERNIORRHAPHY INGUINAL Right 3/27/2015    Procedure: HERNIORRHAPHY INGUINAL;  Surgeon: Ed Jacobson MD;  Location: [facility]     HYDROCELECTOMY SCROTAL  3/10/2014    Procedure: HYDROCELECTOMY SCROTAL;  Bilateral Hydrocele repair;  Surgeon: Steve Herr MD;  Location[facility]     [facility]        Allergies   Allergen Reactions     Oxycodone Other (See Comments)     Passed out and fell over     Penicillins Rash      Social History     Tobacco Use     Smoking status: Never Smoker     Smokeless tobacco: Never Used   Substance Use Topics     Alcohol use: No     Alcohol/week: 0.0 standard drinks     Comment: quit   09      Wt Readings from Last 1 Encounters:   22 83.9 kg (185 lb)        Anesthesia Evaluation            ROS/MED HX  ENT/Pulmonary:     (+) sleep apnea, severe, JOHNSON risk factors, observed stopped breathing,     Neurologic:  - neg neurologic ROS     Cardiovascular:  - neg cardiovascular ROS     METS/Exercise Tolerance:     Hematologic:  - neg hematologic  ROS     Musculoskeletal:  - neg musculoskeletal ROS     GI/Hepatic:     (+) GERD, Asymptomatic on medication,      Renal/Genitourinary: Comment: Stage III CRI    (+) renal disease, type: CRI,     Endo:     (+) thyroid problem, hypothyroidism,     Psychiatric/Substance Use:  - neg psychiatric ROS     Infectious Disease:  - neg infectious disease ROS     Malignancy:  - neg malignancy ROS     Other:            Physical Exam    Airway  airway exam normal      Mallampati: II   TM distance: > 3 FB   Neck ROM: full   Mouth opening: > 3 cm    Respiratory Devices and Support         Dental  no notable dental history         Cardiovascular   cardiovascular exam normal       Rhythm and rate: regular and normal     Pulmonary   pulmonary exam normal        breath sounds clear to auscultation           OUTSIDE LABS:  CBC:   Lab Results   Component Value Date    WBC 9.0 12/27/2004    HGB 15.7 10/15/2018    HGB 15.6 07/10/2017    HCT 48.9 12/27/2004     12/27/2004     BMP:   Lab Results   Component Value Date     05/23/2022     12/10/2020    POTASSIUM 4.7 05/23/2022    POTASSIUM 4.4 12/10/2020    CHLORIDE 111 (H) 05/23/2022    CHLORIDE 107 12/10/2020    CO2 28 05/23/2022    CO2 28 12/10/2020    BUN 15 05/23/2022    BUN 21 12/10/2020    CR 1.06 05/23/2022    CR 0.95 12/10/2020    GLC 91 05/23/2022    GLC 89 12/10/2020     COAGS: No results found for: PTT, INR, FIBR  POC: No results found for: BGM, HCG, HCGS  HEPATIC:   Lab Results   Component Value Date    ALBUMIN 3.6 05/23/2022    PROTTOTAL 7.1 05/23/2022    ALT 62 05/23/2022    AST 29 05/23/2022    ALKPHOS 116 05/23/2022    BILITOTAL 0.4 05/23/2022     OTHER:   Lab Results   Component Value Date    CARLENE 8.8 05/23/2022    LIPASE 240 03/16/2015    TSH 0.53 05/23/2022    T4 1.33 07/14/2016       Anesthesia Plan    ASA Status:  2   NPO Status:  NPO Appropriate    Anesthesia Type: MAC.     - Reason for MAC: straight local not clinically adequate   Induction: Intravenous, Propofol.   Maintenance: TIVA.        Consents    Anesthesia Plan(s) and associated risks, benefits, and  realistic alternatives discussed. Questions answered and patient/representative(s) expressed understanding.    - Discussed:     - Discussed with:  Patient      - Extended Intubation/Ventilatory Support Discussed: No.      - Patient is DNR/DNI Status: No    Use of blood products discussed: No .     Postoperative Care    Pain management: IV analgesics.        Comments:    Other Comments: The risks and benefits of anesthesia, and the alternatives where applicable, have been discussed with the patient, and they wish to proceed.            JULIUS Lucas CRNA

## 2022-09-13 DIAGNOSIS — E03.9 HYPOTHYROIDISM, UNSPECIFIED TYPE: ICD-10-CM

## 2022-09-16 RX ORDER — LEVOTHYROXINE SODIUM 125 UG/1
TABLET ORAL
Qty: 90 TABLET | Refills: 2 | Status: SHIPPED | OUTPATIENT
Start: 2022-09-16 | End: 2023-06-06

## 2022-11-29 DIAGNOSIS — E78.5 HYPERLIPIDEMIA LDL GOAL <130: ICD-10-CM

## 2022-12-02 RX ORDER — EZETIMIBE 10 MG/1
TABLET ORAL
Qty: 90 TABLET | Refills: 1 | Status: SHIPPED | OUTPATIENT
Start: 2022-12-02 | End: 2023-06-06

## 2022-12-02 NOTE — TELEPHONE ENCOUNTER
Prescription approved per [facility] Refill Protocol.  Cristine Fenton RN

## 2023-02-22 DIAGNOSIS — K21.9 GASTROESOPHAGEAL REFLUX DISEASE WITHOUT ESOPHAGITIS: ICD-10-CM

## 2023-02-22 DIAGNOSIS — G47.00 INSOMNIA, UNSPECIFIED TYPE: ICD-10-CM

## 2023-02-22 RX ORDER — AMITRIPTYLINE HYDROCHLORIDE 10 MG/1
10 TABLET ORAL
Qty: 90 TABLET | Refills: 0 | Status: SHIPPED | OUTPATIENT
Start: 2023-02-22 | End: 2023-06-06

## 2023-02-22 NOTE — TELEPHONE ENCOUNTER
Patient has an establish care visit set up and will need refills to hold him until them.    Mirta Coats RN on 2/22/2023 at 10:33 AM

## 2023-02-22 NOTE — TELEPHONE ENCOUNTER
Pending Prescriptions:                       Disp   Refills    omeprazole (PRILOSEC) 20 MG DR capsule    90 cap*0            Sig: Take 1 capsule (20 mg) by mouth daily    amitriptyline (ELAVIL) 10 MG tablet       90 tab*0            Sig: Take 1 tablet (10 mg) by mouth nightly as needed    Medication is being filled for 1 time jennifer refill only due to:  Patient is due for medication follow up    patient has an appointment scheduled    Mirta Coats RN on 2/22/2023 at 11:09 AM

## 2023-02-26 DIAGNOSIS — K21.9 GASTROESOPHAGEAL REFLUX DISEASE WITHOUT ESOPHAGITIS: ICD-10-CM

## 2023-02-26 DIAGNOSIS — G47.00 INSOMNIA, UNSPECIFIED TYPE: ICD-10-CM

## 2023-02-28 RX ORDER — AMITRIPTYLINE HYDROCHLORIDE 10 MG/1
TABLET ORAL
Qty: 90 TABLET | Refills: 0 | OUTPATIENT
Start: 2023-02-28

## 2023-04-20 ENCOUNTER — MYC MEDICAL ADVICE (OUTPATIENT)
Dept: FAMILY MEDICINE | Facility: CLINIC | Age: 68
End: 2023-04-20

## 2023-04-20 ENCOUNTER — OFFICE VISIT (OUTPATIENT)
Dept: FAMILY MEDICINE | Facility: CLINIC | Age: 68
End: 2023-04-20
Payer: COMMERCIAL

## 2023-04-20 VITALS
SYSTOLIC BLOOD PRESSURE: 132 MMHG | RESPIRATION RATE: 18 BRPM | HEART RATE: 80 BPM | TEMPERATURE: 97.5 F | HEIGHT: 72 IN | WEIGHT: 181.2 LBS | DIASTOLIC BLOOD PRESSURE: 92 MMHG | OXYGEN SATURATION: 98 % | BODY MASS INDEX: 24.54 KG/M2

## 2023-04-20 DIAGNOSIS — N18.31 STAGE 3A CHRONIC KIDNEY DISEASE (H): ICD-10-CM

## 2023-04-20 DIAGNOSIS — J34.89 SORE IN NOSE: Primary | ICD-10-CM

## 2023-04-20 DIAGNOSIS — E78.5 HYPERLIPIDEMIA LDL GOAL <100: ICD-10-CM

## 2023-04-20 DIAGNOSIS — Z11.59 NEED FOR HEPATITIS C SCREENING TEST: ICD-10-CM

## 2023-04-20 DIAGNOSIS — E03.9 HYPOTHYROIDISM, UNSPECIFIED TYPE: ICD-10-CM

## 2023-04-20 LAB — TSH SERPL DL<=0.005 MIU/L-ACNC: 0.44 UIU/ML (ref 0.3–4.2)

## 2023-04-20 PROCEDURE — 36415 COLL VENOUS BLD VENIPUNCTURE: CPT | Performed by: FAMILY MEDICINE

## 2023-04-20 PROCEDURE — 84443 ASSAY THYROID STIM HORMONE: CPT | Performed by: FAMILY MEDICINE

## 2023-04-20 PROCEDURE — 99214 OFFICE O/P EST MOD 30 MIN: CPT | Performed by: FAMILY MEDICINE

## 2023-04-20 RX ORDER — ROSUVASTATIN CALCIUM 5 MG/1
5 TABLET, COATED ORAL DAILY
Qty: 60 TABLET | Refills: 1 | Status: SHIPPED | OUTPATIENT
Start: 2023-04-20 | End: 2023-06-07

## 2023-04-20 RX ORDER — MUPIROCIN 20 MG/G
OINTMENT TOPICAL 3 TIMES DAILY
Qty: 30 G | Refills: 1 | Status: SHIPPED | OUTPATIENT
Start: 2023-04-20

## 2023-04-20 ASSESSMENT — PAIN SCALES - GENERAL: PAINLEVEL: NO PAIN (0)

## 2023-04-20 NOTE — PROGRESS NOTES
"  Assessment & Plan       ICD-10-CM    1. Sore in nose  J34.89 mupirocin (BACTROBAN) 2 % external ointment      2. Need for hepatitis C screening test  Z11.59       3. CKD (chronic kidney disease) stage 3, GFR 30-59 ml/min (H)  N18.30       4. Hyperlipidemia LDL goal <100  E78.5 rosuvastatin (CRESTOR) 5 MG tablet     Lipid panel reflex to direct LDL Fasting      5. Hypothyroidism, unspecified type  E03.9 TSH with free T4 reflex     TSH with free T4 reflex         Nose sore.  Suspect impetigo.  Start new percent.  Establish care today, reviewed his medical history  History of CKD, labs up-to-date  History of hyperlipidemia.  LDL at 1 point in the 200s.  Has been on Zetia.  I would like to restart a statin.  Had a history of discolored urine with atorvastatin.  Stable hypothyroidism.  Due for TSH.  Further recommendations to follow    No follow-ups on file.    Chay Salter MD  [facility]      Christian Arthur is a 67 year old, presenting for the following health issues:  Establish Care and Wound Check (On nose/)        4/20/2023    10:08 AM   Additional Questions   Roomed by Terri HAND     History of Present Illness       Reason for visit:  New doctor    He eats 2-3 servings of fruits and vegetables daily.He consumes 0 sweetened beverage(s) daily.He exercises with enough effort to increase his heart rate 10 to 19 minutes per day.  He exercises with enough effort to increase his heart rate 3 or less days per week.   He is taking medications regularly.     amitryp for priti? Saw sleep in 2015, severe priti  Drinks fair of coffee, feels rested  Up 2-3 times at night  Some staying asleep          Review of Systems   Constitutional, HEENT, cardiovascular, pulmonary, gi and gu systems are negative, except as otherwise noted.      Objective    BP (!) 132/92   Pulse 80   Temp 97.5  F (36.4  C) (Temporal)   Resp 18   Ht 1.822 m (5' 11.75\")   Wt 82.2 kg (181 lb 3.2 oz)   SpO2 98%   BMI 24.75 " kg/m    Body mass index is 24.75 kg/m .  Physical Exam   GENERAL: healthy, alert and no distress  NECK: no adenopathy, no asymmetry, masses, or scars and thyroid normal to palpation.  Nares-there is a small fissure and surrounding erythema at the left naris.  Slight honey crusting  RESP: lungs clear to auscultation - no rales, rhonchi or wheezes  CV: regular rate and rhythm, normal S1 S2, no S3 or S4, no murmur, click or rub, no peripheral edema and peripheral pulses strong  ABDOMEN: soft, nontender, no hepatosplenomegaly, no masses and bowel sounds normal  MS: no gross musculoskeletal defects noted, no edema

## 2023-06-03 DIAGNOSIS — G47.00 INSOMNIA, UNSPECIFIED TYPE: ICD-10-CM

## 2023-06-06 DIAGNOSIS — E78.5 HYPERLIPIDEMIA LDL GOAL <130: ICD-10-CM

## 2023-06-06 DIAGNOSIS — E78.5 HYPERLIPIDEMIA LDL GOAL <100: ICD-10-CM

## 2023-06-06 DIAGNOSIS — E03.9 HYPOTHYROIDISM, UNSPECIFIED TYPE: ICD-10-CM

## 2023-06-06 RX ORDER — AMITRIPTYLINE HYDROCHLORIDE 10 MG/1
TABLET ORAL
Qty: 90 TABLET | Refills: 0 | Status: SHIPPED | OUTPATIENT
Start: 2023-06-06 | End: 2023-09-08

## 2023-06-07 RX ORDER — LEVOTHYROXINE SODIUM 125 UG/1
125 TABLET ORAL DAILY
Qty: 90 TABLET | Refills: 3 | Status: SHIPPED | OUTPATIENT
Start: 2023-06-07

## 2023-06-07 NOTE — TELEPHONE ENCOUNTER
Prescription approved per [facility] Refill Protocol.  Mirta Coats RN on 6/7/2023 at 11:02 AM

## 2023-06-07 NOTE — TELEPHONE ENCOUNTER
Pending Prescriptions:                       Disp   Refills    rosuvastatin (CRESTOR) 5 MG tablet [Pharma*60 tab*1        Sig: TAKE 1 TABLET BY MOUTH ONCE DAILY INCREASE BY 1           TABLET EVERY 2 WEEKS UNTIL AT 20MG DAILY    ezetimibe (ZETIA) 10 MG tablet             90 tab*1        Sig: Take 1 tablet (10 mg) by mouth daily    Signed Prescriptions:                        Disp   Refills    levothyroxine (SYNTHROID/LEVOTHROID) 125 M*90 tab*3        Sig: Take 1 tablet (125 mcg) by mouth daily  Authorizing Provider: ALBA JAVIER  Ordering User: MILAD PRYOR      Routing refill request to provider for review/approval because:  Labs not current:  alt, lipid    Milad Pryor RN on 6/7/2023 at 11:02 AM

## 2023-06-07 NOTE — TELEPHONE ENCOUNTER
Patient called to inquirer about rechecking his Lipid after start of Crestor. Patient stating he took the gradual dose in crease as instructed and is now taking 20 mg daily. Prescription updated to a 20 mg tablet. Writer did instruct patient that we would be changing dosing so he would only need to take 1 tablet daily. Informed to soon on recheck of his lab. Last done on 4/20/23 and per documentation to recheck in 3-6 months. Janneth Van LPN

## 2023-06-08 RX ORDER — ROSUVASTATIN CALCIUM 20 MG/1
20 TABLET, COATED ORAL DAILY
Qty: 90 TABLET | Refills: 0 | Status: SHIPPED | OUTPATIENT
Start: 2023-06-08 | End: 2023-09-05

## 2023-06-08 RX ORDER — EZETIMIBE 10 MG/1
10 TABLET ORAL DAILY
Qty: 90 TABLET | Refills: 1 | Status: SHIPPED | OUTPATIENT
Start: 2023-06-08 | End: 2023-12-08

## 2023-08-03 NOTE — TELEPHONE ENCOUNTER
Prescription approved per [facility] Refill Protocol (Synthroid, Omeprazole).    DENISSE CarltonN, RN  [facility]     Cheek-To-Nose Interpolation Flap Text: A decision was made to reconstruct the defect utilizing an interpolation axial flap and a staged reconstruction.  A telfa template was made of the defect.  This telfa template was then used to outline the Cheek-To-Nose Interpolation flap.  The donor area for the pedicle flap was then injected with anesthesia.  The flap was excised through the skin and subcutaneous tissue down to the layer of the underlying musculature.  The interpolation flap was carefully excised within this deep plane to maintain its blood supply.  The edges of the donor site were undermined.   The donor site was closed in a primary fashion.  The pedicle was then rotated into position and sutured.  Once the tube was sutured into place, adequate blood supply was confirmed with blanching and refill.  The pedicle was then wrapped with xeroform gauze and dressed appropriately with a telfa and gauze bandage to ensure continued blood supply and protect the attached pedicle. The skin margins were undermined to an appropriate distance in all directions utilizing iris scissors and carried over to close the primary defect.

## 2023-08-17 ENCOUNTER — LAB (OUTPATIENT)
Dept: LAB | Facility: CLINIC | Age: 68
End: 2023-08-17
Payer: COMMERCIAL

## 2023-08-17 DIAGNOSIS — E78.5 HYPERLIPIDEMIA LDL GOAL <130: ICD-10-CM

## 2023-08-17 LAB
CHOLEST SERPL-MCNC: 116 MG/DL
HDLC SERPL-MCNC: 55 MG/DL
LDLC SERPL CALC-MCNC: 49 MG/DL
NONHDLC SERPL-MCNC: 61 MG/DL
TRIGL SERPL-MCNC: 60 MG/DL

## 2023-08-17 PROCEDURE — 36415 COLL VENOUS BLD VENIPUNCTURE: CPT

## 2023-08-17 PROCEDURE — 80061 LIPID PANEL: CPT

## 2023-08-19 ENCOUNTER — HEALTH MAINTENANCE LETTER (OUTPATIENT)
Age: 68
End: 2023-08-19

## 2023-09-02 DIAGNOSIS — G47.00 INSOMNIA, UNSPECIFIED TYPE: ICD-10-CM

## 2023-09-02 DIAGNOSIS — E78.5 HYPERLIPIDEMIA LDL GOAL <100: ICD-10-CM

## 2023-09-05 RX ORDER — ROSUVASTATIN CALCIUM 20 MG/1
20 TABLET, COATED ORAL DAILY
Qty: 90 TABLET | Refills: 0 | Status: SHIPPED | OUTPATIENT
Start: 2023-09-05 | End: 2023-12-04

## 2023-09-08 DIAGNOSIS — G47.00 INSOMNIA, UNSPECIFIED TYPE: ICD-10-CM

## 2023-09-08 RX ORDER — AMITRIPTYLINE HYDROCHLORIDE 10 MG/1
TABLET ORAL
Qty: 90 TABLET | Refills: 3 | Status: SHIPPED | OUTPATIENT
Start: 2023-09-08 | End: 2024-09-01

## 2023-09-11 RX ORDER — AMITRIPTYLINE HYDROCHLORIDE 10 MG/1
TABLET ORAL
Qty: 90 TABLET | Refills: 0 | OUTPATIENT
Start: 2023-09-11

## 2023-12-03 DIAGNOSIS — E78.5 HYPERLIPIDEMIA LDL GOAL <100: ICD-10-CM

## 2023-12-04 RX ORDER — ROSUVASTATIN CALCIUM 20 MG/1
20 TABLET, COATED ORAL DAILY
Qty: 90 TABLET | Refills: 0 | Status: SHIPPED | OUTPATIENT
Start: 2023-12-04 | End: 2024-03-06

## 2023-12-07 DIAGNOSIS — E78.5 HYPERLIPIDEMIA LDL GOAL <130: ICD-10-CM

## 2023-12-08 RX ORDER — EZETIMIBE 10 MG/1
10 TABLET ORAL DAILY
Qty: 30 TABLET | Refills: 0 | Status: SHIPPED | OUTPATIENT
Start: 2023-12-08 | End: 2024-01-10

## 2024-01-07 DIAGNOSIS — E78.5 HYPERLIPIDEMIA LDL GOAL <130: ICD-10-CM

## 2024-01-10 RX ORDER — EZETIMIBE 10 MG/1
10 TABLET ORAL DAILY
Qty: 30 TABLET | Refills: 0 | Status: SHIPPED | OUTPATIENT
Start: 2024-01-10 | End: 2024-02-12

## 2024-01-11 NOTE — TELEPHONE ENCOUNTER
Patient has been notified of the note below via [employer]. Reminder set for 3 days to send letter if not read.

## 2024-01-11 NOTE — TELEPHONE ENCOUNTER
Chay Salter MD   to [facility]      1/10/24  4:41 PM  Filled. Needs appt for med check, not urgent

## 2024-02-07 DIAGNOSIS — E78.5 HYPERLIPIDEMIA LDL GOAL <130: ICD-10-CM

## 2024-02-12 RX ORDER — EZETIMIBE 10 MG/1
10 TABLET ORAL DAILY
Qty: 30 TABLET | Refills: 0 | Status: SHIPPED | OUTPATIENT
Start: 2024-02-12 | End: 2024-03-06

## 2024-03-05 DIAGNOSIS — E78.5 HYPERLIPIDEMIA LDL GOAL <130: ICD-10-CM

## 2024-03-05 DIAGNOSIS — E78.5 HYPERLIPIDEMIA LDL GOAL <100: ICD-10-CM

## 2024-03-06 RX ORDER — ROSUVASTATIN CALCIUM 20 MG/1
20 TABLET, COATED ORAL DAILY
Qty: 90 TABLET | Refills: 0 | Status: SHIPPED | OUTPATIENT
Start: 2024-03-06 | End: 2024-06-04

## 2024-03-06 RX ORDER — EZETIMIBE 10 MG/1
10 TABLET ORAL DAILY
Qty: 90 TABLET | Refills: 0 | Status: SHIPPED | OUTPATIENT
Start: 2024-03-06 | End: 2024-06-04

## 2024-03-15 NOTE — TELEPHONE ENCOUNTER
Called to schedule EGD, no answer no voicemail           Goal Outcome Evaluation:  Plan of Care Reviewed With: patient        Progress: no change  Outcome Evaluation: Pt sitting UIC and willing to perform seated B LE exercises.  See flowsheet for details. Cont PT per POC progressing to goals as pt tolerates.

## 2024-04-29 SDOH — HEALTH STABILITY: PHYSICAL HEALTH: ON AVERAGE, HOW MANY DAYS PER WEEK DO YOU ENGAGE IN MODERATE TO STRENUOUS EXERCISE (LIKE A BRISK WALK)?: 2 DAYS

## 2024-04-29 SDOH — HEALTH STABILITY: PHYSICAL HEALTH: ON AVERAGE, HOW MANY MINUTES DO YOU ENGAGE IN EXERCISE AT THIS LEVEL?: 20 MIN

## 2024-04-29 ASSESSMENT — SOCIAL DETERMINANTS OF HEALTH (SDOH): HOW OFTEN DO YOU GET TOGETHER WITH FRIENDS OR RELATIVES?: ONCE A WEEK

## 2024-05-01 ENCOUNTER — OFFICE VISIT (OUTPATIENT)
Dept: FAMILY MEDICINE | Facility: CLINIC | Age: 69
End: 2024-05-01
Payer: COMMERCIAL

## 2024-05-01 VITALS
OXYGEN SATURATION: 97 % | BODY MASS INDEX: 23.6 KG/M2 | RESPIRATION RATE: 16 BRPM | TEMPERATURE: 96.6 F | HEART RATE: 63 BPM | HEIGHT: 71 IN | WEIGHT: 168.6 LBS | DIASTOLIC BLOOD PRESSURE: 86 MMHG | SYSTOLIC BLOOD PRESSURE: 110 MMHG

## 2024-05-01 DIAGNOSIS — N18.31 STAGE 3A CHRONIC KIDNEY DISEASE (H): ICD-10-CM

## 2024-05-01 DIAGNOSIS — E03.9 HYPOTHYROIDISM, UNSPECIFIED TYPE: ICD-10-CM

## 2024-05-01 DIAGNOSIS — Z00.00 ANNUAL PHYSICAL EXAM: Primary | ICD-10-CM

## 2024-05-01 DIAGNOSIS — Z11.59 NEED FOR HEPATITIS C SCREENING TEST: ICD-10-CM

## 2024-05-01 LAB
ANION GAP SERPL CALCULATED.3IONS-SCNC: 10 MMOL/L (ref 7–15)
BUN SERPL-MCNC: 14.6 MG/DL (ref 8–23)
CALCIUM SERPL-MCNC: 9.5 MG/DL (ref 8.8–10.2)
CHLORIDE SERPL-SCNC: 103 MMOL/L (ref 98–107)
CREAT SERPL-MCNC: 1.17 MG/DL (ref 0.67–1.17)
CREAT UR-MCNC: 138.9 MG/DL
DEPRECATED HCO3 PLAS-SCNC: 27 MMOL/L (ref 22–29)
EGFRCR SERPLBLD CKD-EPI 2021: 68 ML/MIN/1.73M2
GLUCOSE SERPL-MCNC: 91 MG/DL (ref 70–99)
HCV AB SERPL QL IA: NONREACTIVE
HGB BLD-MCNC: 15.1 G/DL (ref 13.3–17.7)
MICROALBUMIN UR-MCNC: <12 MG/L
MICROALBUMIN/CREAT UR: NORMAL MG/G{CREAT}
POTASSIUM SERPL-SCNC: 4.8 MMOL/L (ref 3.4–5.3)
SODIUM SERPL-SCNC: 140 MMOL/L (ref 135–145)
T4 FREE SERPL-MCNC: 1.58 NG/DL (ref 0.9–1.7)
TSH SERPL DL<=0.005 MIU/L-ACNC: 0.11 UIU/ML (ref 0.3–4.2)

## 2024-05-01 PROCEDURE — 90677 PCV20 VACCINE IM: CPT | Performed by: FAMILY MEDICINE

## 2024-05-01 PROCEDURE — 36415 COLL VENOUS BLD VENIPUNCTURE: CPT | Performed by: FAMILY MEDICINE

## 2024-05-01 PROCEDURE — 99397 PER PM REEVAL EST PAT 65+ YR: CPT | Mod: 25 | Performed by: FAMILY MEDICINE

## 2024-05-01 PROCEDURE — 85018 HEMOGLOBIN: CPT | Performed by: FAMILY MEDICINE

## 2024-05-01 PROCEDURE — 90472 IMMUNIZATION ADMIN EACH ADD: CPT | Performed by: FAMILY MEDICINE

## 2024-05-01 PROCEDURE — 99214 OFFICE O/P EST MOD 30 MIN: CPT | Mod: 25 | Performed by: FAMILY MEDICINE

## 2024-05-01 PROCEDURE — 84439 ASSAY OF FREE THYROXINE: CPT | Performed by: FAMILY MEDICINE

## 2024-05-01 PROCEDURE — 80048 BASIC METABOLIC PNL TOTAL CA: CPT | Performed by: FAMILY MEDICINE

## 2024-05-01 PROCEDURE — 84443 ASSAY THYROID STIM HORMONE: CPT | Performed by: FAMILY MEDICINE

## 2024-05-01 PROCEDURE — 90471 IMMUNIZATION ADMIN: CPT | Performed by: FAMILY MEDICINE

## 2024-05-01 PROCEDURE — 90715 TDAP VACCINE 7 YRS/> IM: CPT | Performed by: FAMILY MEDICINE

## 2024-05-01 PROCEDURE — 82043 UR ALBUMIN QUANTITATIVE: CPT | Performed by: FAMILY MEDICINE

## 2024-05-01 PROCEDURE — 86803 HEPATITIS C AB TEST: CPT | Performed by: FAMILY MEDICINE

## 2024-05-01 PROCEDURE — 82570 ASSAY OF URINE CREATININE: CPT | Performed by: FAMILY MEDICINE

## 2024-05-01 RX ORDER — LEVOTHYROXINE SODIUM 112 UG/1
112 TABLET ORAL DAILY
Qty: 90 TABLET | Refills: 0 | Status: SHIPPED | OUTPATIENT
Start: 2024-05-01 | End: 2024-07-30

## 2024-05-01 ASSESSMENT — PAIN SCALES - GENERAL: PAINLEVEL: NO PAIN (0)

## 2024-05-01 NOTE — PROGRESS NOTES
Preventive Care Visit  [facility]  Chay Salter MD, Family Medicine  May 1, 2024      Assessment & Plan       ICD-10-CM    1. Annual physical exam  Z00.00       2. Need for hepatitis C screening test  Z11.59 Hepatitis C Screen Reflex to HCV RNA Quant and Genotype     Hepatitis C Screen Reflex to HCV RNA Quant and Genotype      3. Stage 3a chronic kidney disease (H)  N18.31 Albumin Random Urine Quantitative with Creat Ratio     Hemoglobin     BASIC METABOLIC PANEL     Albumin Random Urine Quantitative with Creat Ratio     Hemoglobin     BASIC METABOLIC PANEL      4. Hypothyroidism, unspecified type  E03.9 TSH WITH FREE T4 REFLEX     TSH WITH FREE T4 REFLEX     T4 free         Annual topics reviewed.  Positive family history of aortic aneurysm noted.  Brother was a smoker.  Had this repaired. Patient is a non-smoker.  No other risk factors identified.  I let him know that this is a bit of a gray area.  And he could proceed with an ultrasound if he prefers, he like to hold off for now.  But aware that aneurysmal disease is silent and can be life-threatening.    CKD stage III.  Historically stable.  Recheck labs today.    Hypothyroidism.  Historically well-controlled.  Due for labs again.  Return slightly hyperthyroid.  Plan to reduce his dose to 112 mcg and recheck in 6 to 8 weeks          No follow-ups on file.    Chay Salter MD  [facility] GUICHO Arthur is a 68 year old, presenting for the following:  Physical        5/1/2024     7:39 AM   Additional Questions   Roomed by Zunilda PORTER        Health Care Directive  Patient does not have a Health Care Directive or Living Will: Discussed advance care planning with patient; however, patient declined at this time.    HPI    Still with upper GI burning at night, food   [employer] works  Stopped PPI        4/29/2024   General Health   How would you rate your overall physical health? Good   Feel  stress (tense, anxious, or unable to sleep) Not at all         4/29/2024   Nutrition   Diet: Regular (no restrictions)         4/29/2024   Exercise   Days per week of moderate/strenous exercise 2 days   Average minutes spent exercising at this level 20 min   (!) EXERCISE CONCERN      4/29/2024   Social Factors   Frequency of gathering with friends or relatives Once a week   Worry food won't last until get money to buy more No   Food not last or not have enough money for food? No   Do you have housing?  Yes   Are you worried about losing your housing? No   Lack of transportation? No   Unable to get utilities (heat,electricity)? No         5/1/2024   Fall Risk   Fallen 2 or more times in the past year? No   Trouble with walking or balance? No          4/29/2024   Activities of Daily Living- Home Safety   Needs help with the following daily activites None of the above   Safety concerns in the home None of the above         4/29/2024   Dental   Dentist two times every year? Yes         4/29/2024   Hearing Screening   Hearing concerns? None of the above         4/29/2024   Driving Risk Screening   Patient/family members have concerns about driving No         4/29/2024   General Alertness/Fatigue Screening   Have you been more tired than usual lately? No         4/29/2024   Urinary Incontinence Screening   Bothered by leaking urine in past 6 months No         4/29/2024   TB Screening   Were you born outside of the [place]? No         Today's PHQ-2 Score:       5/1/2024     7:42 AM   PHQ-2 ( 1999 [employer])   Q1: Little interest or pleasure in doing things 0   Q2: Feeling down, depressed or hopeless 0   PHQ-2 Score 0   Q1: Little interest or pleasure in doing things Not at all   Q2: Feeling down, depressed or hopeless Not at all   PHQ-2 Score 0           4/29/2024   Substance Use   Alcohol more than 3/day or more than 7/wk No   Do you have a current opioid prescription? No   How severe/bad is pain from 1 to 10? 0/10 (No Pain)   Do  you use any other substances recreationally? No     Social History     Tobacco Use    Smoking status: Never    Smokeless tobacco: Never   Vaping Use    Vaping status: Never Used   Substance Use Topics    Alcohol use: No     Alcohol/week: 0.0 standard drinks of alcohol     Comment: quit   6/17/09    Drug use: No     Comment: meth   quit   6/17/09 4/29/2024   AAA Screening   Family history of Abdominal Aortic Aneurysm (AAA)? (!) YES    Last PSA:   PSA   Date Value Ref Range Status   12/10/2020 1.25 0 - 4 ug/L Final     Comment:     Assay Method:  Chemiluminescence using Siemens Vista analyzer     Prostate Specific Antigen Screen   Date Value Ref Range Status   05/23/2022 1.10 0.00 - 4.00 ug/L Final     ASCVD Risk   The ASCVD Risk score (Shayna LOPES, et al., 2019) failed to calculate for the following reasons:    The valid total cholesterol range is 130 to 320 mg/dL            Reviewed and updated as needed this visit by Provider                      Current providers sharing in care for this patient include:  Patient Care Team:  Chay Salter MD as PCP - General  Chay Salter MD as Assigned PCP    The following health maintenance items are reviewed in Epic and correct as of today:  Health Maintenance   Topic Date Due    HEPATITIS C SCREENING  Never done    RSV VACCINE (Pregnancy & 60+) (1 - 1-dose 60+ series) Never done    MICROALBUMIN  10/15/2019    HEMOGLOBIN  10/15/2019    Pneumococcal Vaccine: 65+ Years (1 of 1 - PCV) Never done    DTAP/TDAP/TD IMMUNIZATION (2 - Td or Tdap) 04/08/2023    MEDICARE ANNUAL WELLNESS VISIT  05/23/2023    BMP  05/23/2023    ANNUAL REVIEW OF HM ORDERS  05/23/2023    COVID-19 Vaccine (5 - 2023-24 season) 02/24/2024    ZOSTER IMMUNIZATION (2 of 2) 01/10/2024    TSH W/FREE T4 REFLEX  04/20/2024    LIPID  08/17/2024    FALL RISK ASSESSMENT  05/01/2025    GLUCOSE  05/23/2025    ADVANCE CARE PLANNING  05/23/2027    COLORECTAL CANCER SCREENING  08/04/2032     "PHQ-2 (once per calendar year)  Completed    INFLUENZA VACCINE  Completed    URINALYSIS  Completed    IPV IMMUNIZATION  Aged Out    HPV IMMUNIZATION  Aged Out    MENINGITIS IMMUNIZATION  Aged Out    RSV MONOCLONAL ANTIBODY  Aged Out         Review of Systems  Constitutional, HEENT, cardiovascular, pulmonary, gi and gu systems are negative, except as otherwise noted.     Objective    Exam  /86   Pulse 63   Temp (!) 96.6  F (35.9  C) (Temporal)   Resp 16   Ht 1.81 m (5' 11.25\")   Wt 76.5 kg (168 lb 9.6 oz)   SpO2 97%   BMI 23.35 kg/m     Estimated body mass index is 23.35 kg/m  as calculated from the following:    Height as of this encounter: 1.81 m (5' 11.25\").    Weight as of this encounter: 76.5 kg (168 lb 9.6 oz).    Physical Exam  GENERAL: alert and no distress  NECK: no adenopathy, no asymmetry, masses, or scars  RESP: lungs clear to auscultation - no rales, rhonchi or wheezes  CV: regular rate and rhythm, normal S1 S2, no S3 or S4, no murmur, click or rub, no peripheral edema  ABDOMEN: soft, nontender, no hepatosplenomegaly, no masses and bowel sounds normal  MS: no gross musculoskeletal defects noted, no edema        5/1/2024   Mini Cog   Clock Draw Score 2 Normal   3 Item Recall 3 objects recalled   Mini Cog Total Score 5             Signed Electronically by: Chay Salter MD    "

## 2024-06-03 DIAGNOSIS — E78.5 HYPERLIPIDEMIA LDL GOAL <130: ICD-10-CM

## 2024-06-03 DIAGNOSIS — E78.5 HYPERLIPIDEMIA LDL GOAL <100: ICD-10-CM

## 2024-06-04 RX ORDER — EZETIMIBE 10 MG/1
10 TABLET ORAL DAILY
Qty: 90 TABLET | Refills: 0 | Status: SHIPPED | OUTPATIENT
Start: 2024-06-04 | End: 2024-09-05

## 2024-06-04 RX ORDER — ROSUVASTATIN CALCIUM 20 MG/1
20 TABLET, COATED ORAL DAILY
Qty: 90 TABLET | Refills: 0 | Status: SHIPPED | OUTPATIENT
Start: 2024-06-04 | End: 2024-09-05

## 2024-07-27 DIAGNOSIS — E03.9 HYPOTHYROIDISM, UNSPECIFIED TYPE: ICD-10-CM

## 2024-07-30 RX ORDER — LEVOTHYROXINE SODIUM 112 UG/1
112 TABLET ORAL DAILY
Qty: 90 TABLET | Refills: 0 | Status: SHIPPED | OUTPATIENT
Start: 2024-07-30

## 2024-07-31 ENCOUNTER — MYC MEDICAL ADVICE (OUTPATIENT)
Dept: FAMILY MEDICINE | Facility: CLINIC | Age: 69
End: 2024-07-31
Payer: COMMERCIAL

## 2024-07-31 NOTE — LETTER
7/31/2024      Jerson Ewing  [address]  [facility] [address]        We are concerned about your health care.  We recently provided you with a medication refill.  Many medications require routine follow-up with your Doctor.      At this time we ask that: You come to your clinic for routine labs for medication monitoring.   Call the clinic at [phone] to schedule.     Your prescription:  Has been filled. Please schedule a follow up visit for first available appointment. Courtesy refills will be given if needed until your scheduled appointment.       Thank you   [facility] Care Team   [phone]

## 2024-08-02 ENCOUNTER — LAB (OUTPATIENT)
Dept: LAB | Facility: CLINIC | Age: 69
End: 2024-08-02
Payer: COMMERCIAL

## 2024-08-02 DIAGNOSIS — E03.9 HYPOTHYROIDISM, UNSPECIFIED TYPE: ICD-10-CM

## 2024-08-02 LAB — TSH SERPL DL<=0.005 MIU/L-ACNC: 0.42 UIU/ML (ref 0.3–4.2)

## 2024-08-02 PROCEDURE — 84443 ASSAY THYROID STIM HORMONE: CPT

## 2024-08-02 PROCEDURE — 36415 COLL VENOUS BLD VENIPUNCTURE: CPT

## 2024-08-31 DIAGNOSIS — G47.00 INSOMNIA, UNSPECIFIED TYPE: ICD-10-CM

## 2024-08-31 DIAGNOSIS — E78.5 HYPERLIPIDEMIA LDL GOAL <130: ICD-10-CM

## 2024-08-31 DIAGNOSIS — E78.5 HYPERLIPIDEMIA LDL GOAL <100: ICD-10-CM

## 2024-09-01 RX ORDER — AMITRIPTYLINE HYDROCHLORIDE 10 MG/1
TABLET ORAL
Qty: 90 TABLET | Refills: 1 | Status: SHIPPED | OUTPATIENT
Start: 2024-09-01

## 2024-09-05 RX ORDER — ROSUVASTATIN CALCIUM 20 MG/1
20 TABLET, COATED ORAL DAILY
Qty: 90 TABLET | Refills: 3 | Status: SHIPPED | OUTPATIENT
Start: 2024-09-05

## 2024-09-05 RX ORDER — EZETIMIBE 10 MG/1
10 TABLET ORAL DAILY
Qty: 90 TABLET | Refills: 3 | Status: SHIPPED | OUTPATIENT
Start: 2024-09-05

## 2024-10-29 DIAGNOSIS — E03.9 HYPOTHYROIDISM, UNSPECIFIED TYPE: ICD-10-CM

## 2024-10-29 RX ORDER — LEVOTHYROXINE SODIUM 112 UG/1
112 TABLET ORAL DAILY
Qty: 90 TABLET | Refills: 0 | Status: SHIPPED | OUTPATIENT
Start: 2024-10-29

## 2025-03-01 DIAGNOSIS — G47.00 INSOMNIA, UNSPECIFIED TYPE: ICD-10-CM

## 2025-03-02 DIAGNOSIS — E03.9 HYPOTHYROIDISM, UNSPECIFIED TYPE: ICD-10-CM

## 2025-03-03 RX ORDER — LEVOTHYROXINE SODIUM 112 UG/1
112 TABLET ORAL DAILY
Qty: 90 TABLET | Refills: 0 | Status: SHIPPED | OUTPATIENT
Start: 2025-03-03

## 2025-03-03 RX ORDER — AMITRIPTYLINE HYDROCHLORIDE 10 MG/1
TABLET ORAL
Qty: 90 TABLET | Refills: 0 | Status: SHIPPED | OUTPATIENT
Start: 2025-03-03

## 2025-05-31 DIAGNOSIS — E03.9 HYPOTHYROIDISM, UNSPECIFIED TYPE: ICD-10-CM

## 2025-05-31 DIAGNOSIS — G47.00 INSOMNIA, UNSPECIFIED TYPE: ICD-10-CM

## 2025-06-02 RX ORDER — AMITRIPTYLINE HYDROCHLORIDE 10 MG/1
10 TABLET ORAL
Qty: 90 TABLET | Refills: 0 | Status: SHIPPED | OUTPATIENT
Start: 2025-06-02

## 2025-06-02 RX ORDER — LEVOTHYROXINE SODIUM 112 UG/1
112 TABLET ORAL DAILY
Qty: 90 TABLET | Refills: 0 | Status: SHIPPED | OUTPATIENT
Start: 2025-06-02

## 2025-06-14 ENCOUNTER — HEALTH MAINTENANCE LETTER (OUTPATIENT)
Age: 70
End: 2025-06-14

## 2025-07-14 ENCOUNTER — TELEPHONE (OUTPATIENT)
Dept: FAMILY MEDICINE | Facility: CLINIC | Age: 70
End: 2025-07-14
Payer: COMMERCIAL

## 2025-07-14 NOTE — TELEPHONE ENCOUNTER
Reason for Call:  Appointment Request    Patient requesting this type of appt:  Preventive + med check refill     Requested provider: Chay Salter    Reason patient unable to be scheduled: Not within requested timeframe    When does patient want to be seen/preferred time: Mondays or Wednesdays early morning works best and before end of aug. Before refills are due.     Comments: NA    Could we send this information to you in [employer] or would you prefer to receive a phone call?:   No preference - both   Okay to leave a detailed message?: Yes at Cell number on file:    Telephone Information:   Mobile [phone]       Call taken on 7/14/2025 at 7:41 AM by Steven Lin

## 2025-07-15 NOTE — TELEPHONE ENCOUNTER
Left message for patient to return call. We do not work in preventative. He can schedule next available and Dr Salter will continue to fill medication as long as an appt is scheduled.     Alma Rosa Pillai MA 7/15/2025

## 2025-08-29 DIAGNOSIS — G47.00 INSOMNIA, UNSPECIFIED TYPE: ICD-10-CM

## 2025-08-29 DIAGNOSIS — E78.5 HYPERLIPIDEMIA LDL GOAL <100: ICD-10-CM

## 2025-08-29 DIAGNOSIS — E03.9 HYPOTHYROIDISM, UNSPECIFIED TYPE: ICD-10-CM

## 2025-08-29 DIAGNOSIS — E78.5 HYPERLIPIDEMIA LDL GOAL <130: ICD-10-CM

## 2025-09-02 RX ORDER — LEVOTHYROXINE SODIUM 112 UG/1
112 TABLET ORAL DAILY
Qty: 90 TABLET | Refills: 0 | Status: SHIPPED | OUTPATIENT
Start: 2025-09-02

## 2025-09-02 RX ORDER — AMITRIPTYLINE HYDROCHLORIDE 10 MG/1
10 TABLET ORAL
Qty: 90 TABLET | Refills: 0 | Status: SHIPPED | OUTPATIENT
Start: 2025-09-02

## 2025-09-02 RX ORDER — EZETIMIBE 10 MG/1
10 TABLET ORAL DAILY
Qty: 90 TABLET | Refills: 0 | Status: SHIPPED | OUTPATIENT
Start: 2025-09-02

## 2025-09-02 RX ORDER — ROSUVASTATIN CALCIUM 20 MG/1
20 TABLET, COATED ORAL DAILY
Qty: 90 TABLET | Refills: 0 | Status: SHIPPED | OUTPATIENT
Start: 2025-09-02

## (undated) DEVICE — TUBING SUCTION 12"X1/4" N612

## (undated) DEVICE — GLOVE EXAM NITRILE LG

## (undated) DEVICE — SOL WATER IRRIG 1000ML BOTTLE 07139-09

## (undated) DEVICE — LUBRICATING JELLY 4.25OZ

## (undated) DEVICE — KIT ENDO TURNOVER/PROCEDURE CARRY-ON 101822